# Patient Record
Sex: FEMALE | Race: BLACK OR AFRICAN AMERICAN | Employment: UNEMPLOYED | URBAN - METROPOLITAN AREA
[De-identification: names, ages, dates, MRNs, and addresses within clinical notes are randomized per-mention and may not be internally consistent; named-entity substitution may affect disease eponyms.]

---

## 2018-06-01 ENCOUNTER — HOSPITAL ENCOUNTER (OUTPATIENT)
Dept: LAB | Age: 58
Discharge: HOME | End: 2018-06-01

## 2018-06-01 PROCEDURE — 99001 SPECIMEN HANDLING PT-LAB: CPT | Performed by: INTERNAL MEDICINE

## 2018-10-12 ENCOUNTER — HOSPITAL ENCOUNTER (OUTPATIENT)
Dept: INFUSION THERAPY | Age: 58
Discharge: HOME | End: 2018-10-12
Payer: MEDICARE

## 2018-10-12 VITALS
RESPIRATION RATE: 18 BRPM | HEART RATE: 75 BPM | TEMPERATURE: 97.9 F | DIASTOLIC BLOOD PRESSURE: 75 MMHG | OXYGEN SATURATION: 96 % | SYSTOLIC BLOOD PRESSURE: 135 MMHG

## 2018-10-12 PROCEDURE — 96372 THER/PROPH/DIAG INJ SC/IM: CPT

## 2018-10-12 PROCEDURE — 74011250636 HC RX REV CODE- 250/636

## 2018-10-12 PROCEDURE — C9466 INJECTION, BENRALIZUMAB: HCPCS

## 2018-10-12 RX ADMIN — BENRALIZUMAB 30 MG: 30 INJECTION, SOLUTION SUBCUTANEOUS at 11:45

## 2018-10-12 NOTE — PROGRESS NOTES
[facility] Progress Note Date: 2018 Name: Radha Crawley MRN: [id number] : 1960 Fasenra 1 of 3 q4 weeks Ms. Arun Up arrived to [facility] at 80 accompanied by her daughter. Ms. Arun Up was assessed and education was provided. Ms. Elaine Rodriguez vitals were reviewed. Visit Vitals  /75 (BP 1 Location: Left arm, BP Patient Position: At rest)  Pulse 75  Temp 97.9 °F (36.6 °C)  Resp 18  SpO2 96%  Breastfeeding No  
 
 
Fasenra  30 mg was administered as ordered SQ in patient's Left upper arm. Band-aid applied to site. Reviewed discharge instructions with patient, including expected side effects (abdominal cramping, nausea, changes in color of urine or feces) and signs of allergic reaction requiring medical attention (itching/hives/rashes, SOB, chest pain, lip/tongue/facial swelling). Patient given printed copy to take home. Patient verbalized understanding of discharge instructions. Care notes uploaded in patient chart. Patient agreed to be observed for 10 mins. During that time no adverse reaction was noted. Ms. Arun Up tolerated well without complaints. Ms. Arun Up was discharged from [place] [address] in stable condition at 1205. She is to return on 2018 at 1130 for her next appointment. Tray Farmer RN 2018

## 2018-11-09 ENCOUNTER — HOSPITAL ENCOUNTER (OUTPATIENT)
Dept: INFUSION THERAPY | Age: 58
Discharge: HOME | End: 2018-11-09
Payer: MEDICARE

## 2018-11-14 ENCOUNTER — HOSPITAL ENCOUNTER (OUTPATIENT)
Dept: INFUSION THERAPY | Age: 58
Discharge: HOME | End: 2018-11-14
Payer: MEDICARE

## 2018-11-16 ENCOUNTER — HOSPITAL ENCOUNTER (OUTPATIENT)
Dept: INFUSION THERAPY | Age: 58
Discharge: HOME | End: 2018-11-16
Payer: MEDICARE

## 2018-11-16 VITALS
OXYGEN SATURATION: 97 % | SYSTOLIC BLOOD PRESSURE: 109 MMHG | TEMPERATURE: 98.9 F | DIASTOLIC BLOOD PRESSURE: 72 MMHG | RESPIRATION RATE: 18 BRPM | HEART RATE: 87 BPM

## 2018-11-16 PROCEDURE — C9466 INJECTION, BENRALIZUMAB: HCPCS

## 2018-11-16 PROCEDURE — 74011250636 HC RX REV CODE- 250/636

## 2018-11-16 PROCEDURE — 96372 THER/PROPH/DIAG INJ SC/IM: CPT

## 2018-11-16 RX ADMIN — BENRALIZUMAB 30 MG: 30 INJECTION, SOLUTION SUBCUTANEOUS at 11:06

## 2018-11-16 NOTE — PROGRESS NOTES
[facility] Progress Note Date: 2018 Name: Milagro Chen MRN: [id number] : 1960 Fasenra 2 of 3 q4 weeks Ms. Katt Riley arrived to [facility] at [phone] accompanied by her daughter. Ms. Katt Riley was assessed and education was provided. Ms. Tevin Rodriguez vitals were reviewed. There were no vitals taken for this visit. Fasenra  30 mg was administered as ordered SQ in patient's Left upper arm. Band-aid applied to site. Ms. Katt Riley tolerated well without complaints. Ms. Katt Riley was discharged from [place] [address] in stable condition at 1110. She is to return on 2018 at 1100 for her next appointment. Renetta Soriano 2018

## 2018-12-07 ENCOUNTER — APPOINTMENT (OUTPATIENT)
Dept: INFUSION THERAPY | Age: 58
End: 2018-12-07

## 2018-12-12 ENCOUNTER — APPOINTMENT (OUTPATIENT)
Dept: INFUSION THERAPY | Age: 58
End: 2018-12-12
Payer: MEDICARE

## 2018-12-14 ENCOUNTER — HOSPITAL ENCOUNTER (OUTPATIENT)
Dept: INFUSION THERAPY | Age: 58
Discharge: HOME | End: 2018-12-14
Payer: MEDICARE

## 2018-12-21 ENCOUNTER — HOSPITAL ENCOUNTER (OUTPATIENT)
Dept: INFUSION THERAPY | Age: 58
Discharge: HOME | End: 2018-12-21
Payer: MEDICARE

## 2018-12-21 VITALS
DIASTOLIC BLOOD PRESSURE: 59 MMHG | SYSTOLIC BLOOD PRESSURE: 111 MMHG | OXYGEN SATURATION: 93 % | HEART RATE: 95 BPM | TEMPERATURE: 98.1 F | RESPIRATION RATE: 18 BRPM

## 2018-12-21 DIAGNOSIS — J45.901 EXACERBATION OF ASTHMA, UNSPECIFIED ASTHMA SEVERITY, UNSPECIFIED WHETHER PERSISTENT: ICD-10-CM

## 2018-12-21 PROCEDURE — 96372 THER/PROPH/DIAG INJ SC/IM: CPT

## 2018-12-21 PROCEDURE — C9466 INJECTION, BENRALIZUMAB: HCPCS

## 2018-12-21 PROCEDURE — 74011250636 HC RX REV CODE- 250/636

## 2018-12-21 RX ADMIN — BENRALIZUMAB 30 MG: 30 INJECTION, SOLUTION SUBCUTANEOUS at 14:34

## 2018-12-21 NOTE — PROGRESS NOTES
[facility] Progress Note    Date: 2018    Name: Cisco Alvarado    MRN: [id number]         : 1960    Len Pickard    Ms. Sujatha Delacruz was assessed and education was provided. Ms. Dennise Costa vitals were reviewed and patient was observed for 5 minutes prior to treatment. Visit Vitals  /59 (BP 1 Location: Right arm, BP Patient Position: Sitting)   Pulse 95   Temp 98.1 °F (36.7 °C)   Resp 18   SpO2 93%   Breastfeeding? No       Lab results were obtained and reviewed. No results found for this or any previous visit (from the past 12 hour(s)). Fasenra 30 mg was administered subcutaneous in  Left upper arm. Bans aid placed at site. Ms. Sujatha Delacruz tolerated well, and had no complaints. Patient armband removed and shredded. Ms. Sujatha Delacruz was discharged from [place] [address] in stable condition at 1440. She is to return on 2/15/19 at 1400 for her next appointment.     Shanell Tavarez RN  2018  3:50 PM

## 2019-01-16 ENCOUNTER — APPOINTMENT (OUTPATIENT)
Dept: INFUSION THERAPY | Age: 59
End: 2019-01-16

## 2019-02-01 ENCOUNTER — APPOINTMENT (OUTPATIENT)
Dept: INFUSION THERAPY | Age: 59
End: 2019-02-01
Payer: MEDICARE

## 2019-02-08 ENCOUNTER — APPOINTMENT (OUTPATIENT)
Dept: INFUSION THERAPY | Age: 59
End: 2019-02-08

## 2019-06-07 ENCOUNTER — HOSPITAL ENCOUNTER (OUTPATIENT)
Dept: INFUSION THERAPY | Age: 59
Discharge: HOME | End: 2019-06-07

## 2019-09-24 ENCOUNTER — HOSPITAL ENCOUNTER (OUTPATIENT)
Dept: INFUSION THERAPY | Age: 59
Discharge: HOME | End: 2019-09-24
Payer: MEDICARE

## 2019-09-24 VITALS
OXYGEN SATURATION: 98 % | DIASTOLIC BLOOD PRESSURE: 69 MMHG | TEMPERATURE: 97.9 F | SYSTOLIC BLOOD PRESSURE: 111 MMHG | RESPIRATION RATE: 18 BRPM | HEART RATE: 90 BPM

## 2019-09-24 DIAGNOSIS — J45.901 EXACERBATION OF ASTHMA, UNSPECIFIED ASTHMA SEVERITY, UNSPECIFIED WHETHER PERSISTENT: ICD-10-CM

## 2019-09-24 PROCEDURE — 96372 THER/PROPH/DIAG INJ SC/IM: CPT

## 2019-09-24 PROCEDURE — 74011250636 HC RX REV CODE- 250/636: Performed by: NURSE PRACTITIONER

## 2019-09-24 RX ORDER — GABAPENTIN 600 MG/1
800 TABLET ORAL
COMMUNITY

## 2019-09-24 RX ADMIN — BENRALIZUMAB 30 MG: 30 INJECTION, SOLUTION SUBCUTANEOUS at 09:33

## 2019-09-24 NOTE — PROGRESS NOTES
[facility] Progress Note    Date: 2019    Name: Roxy Barbour    MRN: [id number]         : 1960    Yates Pan 1 of 3    Ms. Bennie Nails was assessed and education was provided. Ms. Maribell Ortiz vitals were reviewed and patient was observed for 5 minutes prior to treatment. Visit Vitals  /69 (BP 1 Location: Left arm, BP Patient Position: Sitting)   Pulse 90   Temp 97.9 °F (36.6 °C)   Resp 18   SpO2 98%   Breastfeeding? No       Lab results were obtained and reviewed. No results found for this or any previous visit (from the past 12 hour(s)). Fasenra 30 mg was administered subcutaneous in  Left upper arm. Bans aid placed at site. Ms. Bennie Nails tolerated well, and had no complaints. Patient armband removed and shredded. Ms. Bennie Nails was discharged from [place] [address] in stable condition at [address] She is to return on 10/22/19 at 0930 for her next appointment.     Dagoberto Causey RN  2019  3:50 PM

## 2019-10-08 ENCOUNTER — APPOINTMENT (OUTPATIENT)
Dept: GENERAL RADIOLOGY | Age: 59
DRG: 190 | End: 2019-10-08
Attending: EMERGENCY MEDICINE
Payer: MEDICARE

## 2019-10-08 ENCOUNTER — HOSPITAL ENCOUNTER (INPATIENT)
Age: 59
LOS: 7 days | Discharge: HOME | DRG: 190 | End: 2019-10-17
Attending: EMERGENCY MEDICINE | Admitting: HOSPITALIST
Payer: MEDICARE

## 2019-10-08 DIAGNOSIS — J40 BRONCHITIS: ICD-10-CM

## 2019-10-08 DIAGNOSIS — J44.1 COPD EXACERBATION (HCC): ICD-10-CM

## 2019-10-08 DIAGNOSIS — J45.51 SEVERE PERSISTENT ASTHMA WITH ACUTE EXACERBATION: Primary | ICD-10-CM

## 2019-10-08 PROBLEM — J96.01 ACUTE RESPIRATORY FAILURE WITH HYPOXEMIA (HCC): Status: ACTIVE | Noted: 2019-10-08

## 2019-10-08 PROBLEM — E11.9 DM (DIABETES MELLITUS) (HCC): Status: ACTIVE | Noted: 2019-10-08

## 2019-10-08 LAB
ALBUMIN SERPL-MCNC: 3.4 G/DL (ref 3.4–5)
ALBUMIN/GLOB SERPL: 0.9 {RATIO} (ref 0.8–1.7)
ALP SERPL-CCNC: 102 U/L (ref 45–117)
ALT SERPL-CCNC: 20 U/L (ref 13–56)
ANION GAP SERPL CALC-SCNC: 5 MMOL/L (ref 3–18)
ARTERIAL PATENCY WRIST A: YES
AST SERPL-CCNC: 31 U/L (ref 10–38)
BASE DEFICIT BLD-SCNC: 3 MMOL/L
BASOPHILS # BLD: 0 K/UL (ref 0–0.1)
BASOPHILS NFR BLD: 0 % (ref 0–2)
BDY SITE: NORMAL
BILIRUB SERPL-MCNC: 0.4 MG/DL (ref 0.2–1)
BODY TEMPERATURE: 98.8
BUN SERPL-MCNC: 8 MG/DL (ref 7–18)
BUN/CREAT SERPL: 11 (ref 12–20)
CALCIUM SERPL-MCNC: 8.4 MG/DL (ref 8.5–10.1)
CHLORIDE SERPL-SCNC: 107 MMOL/L (ref 100–111)
CO2 SERPL-SCNC: 27 MMOL/L (ref 21–32)
CREAT SERPL-MCNC: 0.72 MG/DL (ref 0.6–1.3)
DIFFERENTIAL METHOD BLD: ABNORMAL
EOSINOPHIL # BLD: 0 K/UL (ref 0–0.4)
EOSINOPHIL NFR BLD: 0 % (ref 0–5)
ERYTHROCYTE [DISTWIDTH] IN BLOOD BY AUTOMATED COUNT: 18 % (ref 11.6–14.5)
EST. AVERAGE GLUCOSE BLD GHB EST-MCNC: 166 MG/DL
GAS FLOW.O2 O2 DELIVERY SYS: NORMAL L/MIN
GAS FLOW.O2 SETTING OXYMISER: 2 L/M
GLOBULIN SER CALC-MCNC: 3.7 G/DL (ref 2–4)
GLUCOSE BLD STRIP.AUTO-MCNC: 373 MG/DL (ref 70–110)
GLUCOSE SERPL-MCNC: 162 MG/DL (ref 74–99)
HBA1C MFR BLD: 7.4 % (ref 4.2–5.6)
HCO3 BLD-SCNC: 22.3 MMOL/L (ref 22–26)
HCT VFR BLD AUTO: 31.2 % (ref 35–45)
HGB BLD-MCNC: 9.2 G/DL (ref 12–16)
LYMPHOCYTES # BLD: 3.4 K/UL (ref 0.9–3.6)
LYMPHOCYTES NFR BLD: 38 % (ref 21–52)
MCH RBC QN AUTO: 21.5 PG (ref 24–34)
MCHC RBC AUTO-ENTMCNC: 29.5 G/DL (ref 31–37)
MCV RBC AUTO: 73.1 FL (ref 74–97)
MONOCYTES # BLD: 0.4 K/UL (ref 0.05–1.2)
MONOCYTES NFR BLD: 4 % (ref 3–10)
NEUTS SEG # BLD: 5.3 K/UL (ref 1.8–8)
NEUTS SEG NFR BLD: 58 % (ref 40–73)
O2/TOTAL GAS SETTING VFR VENT: 0.28 %
PCO2 BLD: 38.6 MMHG (ref 35–45)
PH BLD: 7.37 [PH] (ref 7.35–7.45)
PLATELET # BLD AUTO: 359 K/UL (ref 135–420)
PMV BLD AUTO: 9.2 FL (ref 9.2–11.8)
PO2 BLD: 82 MMHG (ref 80–100)
POTASSIUM SERPL-SCNC: 4.1 MMOL/L (ref 3.5–5.5)
PROT SERPL-MCNC: 7.1 G/DL (ref 6.4–8.2)
RBC # BLD AUTO: 4.27 M/UL (ref 4.2–5.3)
SAO2 % BLD: 96 % (ref 92–97)
SERVICE CMNT-IMP: NORMAL
SODIUM SERPL-SCNC: 139 MMOL/L (ref 136–145)
SPECIMEN TYPE: NORMAL
TOTAL RESP. RATE, ITRR: 20
TROPONIN I SERPL-MCNC: <0.02 NG/ML (ref 0–0.04)
WBC # BLD AUTO: 9.1 K/UL (ref 4.6–13.2)

## 2019-10-08 PROCEDURE — 36600 WITHDRAWAL OF ARTERIAL BLOOD: CPT

## 2019-10-08 PROCEDURE — 85025 COMPLETE CBC W/AUTO DIFF WBC: CPT

## 2019-10-08 PROCEDURE — 83036 HEMOGLOBIN GLYCOSYLATED A1C: CPT

## 2019-10-08 PROCEDURE — 94640 AIRWAY INHALATION TREATMENT: CPT

## 2019-10-08 PROCEDURE — 96365 THER/PROPH/DIAG IV INF INIT: CPT

## 2019-10-08 PROCEDURE — 74011250637 HC RX REV CODE- 250/637: Performed by: HOSPITALIST

## 2019-10-08 PROCEDURE — 94762 N-INVAS EAR/PLS OXIMTRY CONT: CPT

## 2019-10-08 PROCEDURE — 74011000250 HC RX REV CODE- 250: Performed by: HOSPITALIST

## 2019-10-08 PROCEDURE — 74011000250 HC RX REV CODE- 250: Performed by: EMERGENCY MEDICINE

## 2019-10-08 PROCEDURE — 80053 COMPREHEN METABOLIC PANEL: CPT

## 2019-10-08 PROCEDURE — 93005 ELECTROCARDIOGRAM TRACING: CPT

## 2019-10-08 PROCEDURE — 94761 N-INVAS EAR/PLS OXIMETRY MLT: CPT

## 2019-10-08 PROCEDURE — 74011250636 HC RX REV CODE- 250/636: Performed by: HOSPITALIST

## 2019-10-08 PROCEDURE — 99218 HC RM OBSERVATION: CPT

## 2019-10-08 PROCEDURE — 96376 TX/PRO/DX INJ SAME DRUG ADON: CPT

## 2019-10-08 PROCEDURE — 82962 GLUCOSE BLOOD TEST: CPT

## 2019-10-08 PROCEDURE — 71046 X-RAY EXAM CHEST 2 VIEWS: CPT

## 2019-10-08 PROCEDURE — 74011636637 HC RX REV CODE- 636/637: Performed by: HOSPITALIST

## 2019-10-08 PROCEDURE — 82803 BLOOD GASES ANY COMBINATION: CPT

## 2019-10-08 PROCEDURE — 96372 THER/PROPH/DIAG INJ SC/IM: CPT

## 2019-10-08 PROCEDURE — 84484 ASSAY OF TROPONIN QUANT: CPT

## 2019-10-08 PROCEDURE — 96375 TX/PRO/DX INJ NEW DRUG ADDON: CPT

## 2019-10-08 PROCEDURE — 99285 EMERGENCY DEPT VISIT HI MDM: CPT

## 2019-10-08 PROCEDURE — 74011250636 HC RX REV CODE- 250/636: Performed by: EMERGENCY MEDICINE

## 2019-10-08 RX ORDER — ACETAMINOPHEN 325 MG/1
650 TABLET ORAL
Status: DISCONTINUED | OUTPATIENT
Start: 2019-10-08 | End: 2019-10-17 | Stop reason: HOSPADM

## 2019-10-08 RX ORDER — MONTELUKAST SODIUM 10 MG/1
10 TABLET ORAL
Status: DISCONTINUED | OUTPATIENT
Start: 2019-10-08 | End: 2019-10-17 | Stop reason: HOSPADM

## 2019-10-08 RX ORDER — HEPARIN SODIUM 5000 [USP'U]/ML
5000 INJECTION, SOLUTION INTRAVENOUS; SUBCUTANEOUS EVERY 8 HOURS
Status: DISCONTINUED | OUTPATIENT
Start: 2019-10-08 | End: 2019-10-17 | Stop reason: HOSPADM

## 2019-10-08 RX ORDER — ALBUTEROL SULFATE 0.83 MG/ML
10 SOLUTION RESPIRATORY (INHALATION)
Status: COMPLETED | OUTPATIENT
Start: 2019-10-08 | End: 2019-10-08

## 2019-10-08 RX ORDER — IPRATROPIUM BROMIDE AND ALBUTEROL SULFATE 2.5; .5 MG/3ML; MG/3ML
3 SOLUTION RESPIRATORY (INHALATION)
Status: DISCONTINUED | OUTPATIENT
Start: 2019-10-08 | End: 2019-10-11

## 2019-10-08 RX ORDER — GABAPENTIN 300 MG/1
600 CAPSULE ORAL 3 TIMES DAILY
Status: DISCONTINUED | OUTPATIENT
Start: 2019-10-08 | End: 2019-10-17 | Stop reason: HOSPADM

## 2019-10-08 RX ORDER — GUAIFENESIN 100 MG/5ML
100 SOLUTION ORAL
Status: DISCONTINUED | OUTPATIENT
Start: 2019-10-08 | End: 2019-10-09

## 2019-10-08 RX ORDER — MAGNESIUM SULFATE HEPTAHYDRATE 40 MG/ML
2 INJECTION, SOLUTION INTRAVENOUS
Status: COMPLETED | OUTPATIENT
Start: 2019-10-08 | End: 2019-10-08

## 2019-10-08 RX ORDER — MAGNESIUM SULFATE HEPTAHYDRATE 500 MG/ML
2 INJECTION, SOLUTION INTRAMUSCULAR; INTRAVENOUS
Status: DISCONTINUED | OUTPATIENT
Start: 2019-10-08 | End: 2019-10-08 | Stop reason: SDUPTHER

## 2019-10-08 RX ORDER — OXYCODONE AND ACETAMINOPHEN 10; 325 MG/1; MG/1
1 TABLET ORAL EVERY 12 HOURS
Status: DISCONTINUED | OUTPATIENT
Start: 2019-10-08 | End: 2019-10-09

## 2019-10-08 RX ORDER — IPRATROPIUM BROMIDE AND ALBUTEROL SULFATE 2.5; .5 MG/3ML; MG/3ML
3 SOLUTION RESPIRATORY (INHALATION) ONCE
Status: COMPLETED | OUTPATIENT
Start: 2019-10-08 | End: 2019-10-08

## 2019-10-08 RX ORDER — QUETIAPINE FUMARATE 300 MG/1
300 TABLET, FILM COATED ORAL
Status: DISCONTINUED | OUTPATIENT
Start: 2019-10-08 | End: 2019-10-17 | Stop reason: HOSPADM

## 2019-10-08 RX ORDER — SERTRALINE HYDROCHLORIDE 50 MG/1
150 TABLET, FILM COATED ORAL
Status: DISCONTINUED | OUTPATIENT
Start: 2019-10-08 | End: 2019-10-17 | Stop reason: HOSPADM

## 2019-10-08 RX ORDER — PANTOPRAZOLE SODIUM 40 MG/1
40 TABLET, DELAYED RELEASE ORAL DAILY
Status: DISCONTINUED | OUTPATIENT
Start: 2019-10-09 | End: 2019-10-11

## 2019-10-08 RX ORDER — AMLODIPINE BESYLATE 5 MG/1
10 TABLET ORAL DAILY
Status: DISCONTINUED | OUTPATIENT
Start: 2019-10-09 | End: 2019-10-17 | Stop reason: HOSPADM

## 2019-10-08 RX ORDER — MAGNESIUM SULFATE 100 %
4 CRYSTALS MISCELLANEOUS AS NEEDED
Status: DISCONTINUED | OUTPATIENT
Start: 2019-10-08 | End: 2019-10-17 | Stop reason: HOSPADM

## 2019-10-08 RX ORDER — DOXYCYCLINE 100 MG/1
100 CAPSULE ORAL EVERY 12 HOURS
Status: DISCONTINUED | OUTPATIENT
Start: 2019-10-08 | End: 2019-10-11

## 2019-10-08 RX ORDER — ALBUTEROL SULFATE 0.83 MG/ML
5 SOLUTION RESPIRATORY (INHALATION)
Status: COMPLETED | OUTPATIENT
Start: 2019-10-08 | End: 2019-10-08

## 2019-10-08 RX ORDER — SERTRALINE HYDROCHLORIDE 50 MG/1
100 TABLET, FILM COATED ORAL DAILY
Status: DISCONTINUED | OUTPATIENT
Start: 2019-10-09 | End: 2019-10-17 | Stop reason: HOSPADM

## 2019-10-08 RX ORDER — INSULIN LISPRO 100 [IU]/ML
INJECTION, SOLUTION INTRAVENOUS; SUBCUTANEOUS
Status: DISCONTINUED | OUTPATIENT
Start: 2019-10-08 | End: 2019-10-09

## 2019-10-08 RX ORDER — KETOROLAC TROMETHAMINE 30 MG/ML
60 INJECTION, SOLUTION INTRAMUSCULAR; INTRAVENOUS
Status: DISCONTINUED | OUTPATIENT
Start: 2019-10-08 | End: 2019-10-08

## 2019-10-08 RX ORDER — KETOROLAC TROMETHAMINE 30 MG/ML
30 INJECTION, SOLUTION INTRAMUSCULAR; INTRAVENOUS
Status: COMPLETED | OUTPATIENT
Start: 2019-10-08 | End: 2019-10-08

## 2019-10-08 RX ORDER — CLONAZEPAM 0.5 MG/1
1 TABLET ORAL
Status: DISCONTINUED | OUTPATIENT
Start: 2019-10-08 | End: 2019-10-17 | Stop reason: HOSPADM

## 2019-10-08 RX ADMIN — ALBUTEROL SULFATE 5 MG: 2.5 SOLUTION RESPIRATORY (INHALATION) at 16:48

## 2019-10-08 RX ADMIN — IPRATROPIUM BROMIDE AND ALBUTEROL SULFATE 3 ML: .5; 3 SOLUTION RESPIRATORY (INHALATION) at 21:35

## 2019-10-08 RX ADMIN — SODIUM CHLORIDE 1000 ML: 900 INJECTION, SOLUTION INTRAVENOUS at 15:24

## 2019-10-08 RX ADMIN — GABAPENTIN 600 MG: 300 CAPSULE ORAL at 22:13

## 2019-10-08 RX ADMIN — ALBUTEROL SULFATE 10 MG: 2.5 SOLUTION RESPIRATORY (INHALATION) at 15:24

## 2019-10-08 RX ADMIN — MAGNESIUM SULFATE IN WATER 2 G: 40 INJECTION, SOLUTION INTRAVENOUS at 15:24

## 2019-10-08 RX ADMIN — DOXYCYCLINE 100 MG: 100 CAPSULE ORAL at 22:14

## 2019-10-08 RX ADMIN — KETOROLAC TROMETHAMINE 30 MG: 30 INJECTION, SOLUTION INTRAMUSCULAR; INTRAVENOUS at 16:32

## 2019-10-08 RX ADMIN — IPRATROPIUM BROMIDE AND ALBUTEROL SULFATE 3 ML: .5; 3 SOLUTION RESPIRATORY (INHALATION) at 15:24

## 2019-10-08 RX ADMIN — MONTELUKAST 10 MG: 10 TABLET, FILM COATED ORAL at 22:13

## 2019-10-08 RX ADMIN — HEPARIN SODIUM 5000 UNITS: 5000 INJECTION INTRAVENOUS; SUBCUTANEOUS at 22:14

## 2019-10-08 RX ADMIN — METHYLPREDNISOLONE SODIUM SUCCINATE 125 MG: 125 INJECTION, POWDER, FOR SOLUTION INTRAMUSCULAR; INTRAVENOUS at 15:24

## 2019-10-08 RX ADMIN — QUETIAPINE FUMARATE 300 MG: 300 TABLET ORAL at 22:13

## 2019-10-08 RX ADMIN — INSULIN LISPRO 10 UNITS: 100 INJECTION, SOLUTION INTRAVENOUS; SUBCUTANEOUS at 22:15

## 2019-10-08 RX ADMIN — GUAIFENESIN 100 MG: 200 SOLUTION ORAL at 22:19

## 2019-10-08 RX ADMIN — SERTRALINE HYDROCHLORIDE 150 MG: 50 TABLET ORAL at 23:26

## 2019-10-08 RX ADMIN — METHYLPREDNISOLONE SODIUM SUCCINATE 60 MG: 125 INJECTION, POWDER, FOR SOLUTION INTRAMUSCULAR; INTRAVENOUS at 23:27

## 2019-10-08 RX ADMIN — OXYCODONE HYDROCHLORIDE AND ACETAMINOPHEN 1 TABLET: 10; 325 TABLET ORAL at 22:15

## 2019-10-08 NOTE — H&P
Internal Medicine History and Physical          Subjective     HPI: Taurus Aj is a 64 y.o. female with a noted PMHx of who presented to the ED with worsening, constant fevers, fatigue, productive cough, and dyspnea for the past 14 days. She was seen by her outpatient pulmonologist Dr. Clara Lanier who sees her for COPD. He prescribed levaquin x 14 days, breathing treatments, muccinex, and steroids without relief. Dr. Clara Lanier sent patient to the ED for further eval.  CXR was unremarkable for PNA or any other acute cardiopulm abnorm. She was given multiple breathing treatments, steroids, IV mag, fluids. ABG unremarkable.   She is requiring oxygen which she does not wear as an outpatient    PMHx:  Past Medical History:   Diagnosis Date    Arthritis     Asthma     Chronic pain     Bilateral knees    COPD     Diabetes mellitus ([facility]     GERD (gastroesophageal reflux disease)     HTN (hypertension)     Morbid obesity ([facility]     Osteoarthritis of right knee 2016    Psychiatric disorder     Major depression, PTSD, and anxiety       PSurgHx:  Past Surgical History:   Procedure Laterality Date    HX  SECTION      HX OTHER SURGICAL      Facial reconstruction       SocialHx:  Social History     Socioeconomic History    Marital status:      Spouse name: Not on file    Number of children: Not on file    Years of education: Not on file    Highest education level: Not on file   Occupational History    Not on file   Social Needs    Financial resource strain: Not on file    Food insecurity:     Worry: Not on file     Inability: Not on file    Transportation needs:     Medical: Not on file     Non-medical: Not on file   Tobacco Use    Smoking status: Former Smoker     Packs/day: 0.50     Years: 35.00     Pack years: 17.50     Last attempt to quit: 2019     Years since quittin.0    Smokeless tobacco: Never Used   Substance and Sexual Activity    Alcohol use: No    Drug use: No    Sexual activity: Never   Lifestyle    Physical activity:     Days per week: Not on file     Minutes per session: Not on file    Stress: Not on file   Relationships    Social connections:     Talks on phone: Not on file     Gets together: Not on file     Attends Islam service: Not on file     Active member of club or organization: Not on file     Attends meetings of clubs or organizations: Not on file     Relationship status: Not on file    Intimate partner violence:     Fear of current or ex partner: Not on file     Emotionally abused: Not on file     Physically abused: Not on file     Forced sexual activity: Not on file   Other Topics Concern    Not on file   Social History Narrative    Not on file       Allergies: Allergies   Allergen Reactions    Latex Hives and Itching    Pollens Extract Sneezing    Demerol [Meperidine] Hives and Itching    Penicillins Rash    Shellfish Derived Itching    Soap Itching     Jergen's, Ivory    Tessalon [Benzonatate] Swelling       FamilyHx:  History reviewed. No pertinent family history. Prior to Admission Medications   Prescriptions Last Dose Informant Patient Reported? Taking? CALCIUM CITRATE/VITAMIN D3 (CITRACAL + D PO)   Yes No   Sig: Take 1 Tab by mouth daily. QUEtiapine (SEROQUEL) 300 mg tablet   Yes No   Sig: Take 300 mg by mouth nightly. albuterol (PROVENTIL HFA, VENTOLIN HFA, PROAIR HFA) 90 mcg/actuation inhaler   Yes No   Sig: Take 2 Puffs by inhalation every four (4) hours as needed for Wheezing. amLODIPine (NORVASC) 10 mg tablet   Yes No   Sig: Take  by mouth daily. clonazePAM (KLONOPIN) 1 mg tablet   Yes No   Sig: Take 1 mg by mouth three (3) times daily as needed. gabapentin (NEURONTIN) 600 mg tablet   Yes No   Sig: Take  by mouth three (3) times daily. glimepiride (AMARYL) 2 mg tablet   Yes No   Sig: Take 2 mg by mouth daily (with breakfast). loratadine (CLARITIN) 10 mg tablet   Yes No   Sig: Take 10 mg by mouth daily. meloxicam (MOBIC) 7.5 mg tablet   No No   Sig: Take 1 Tab by mouth two (2) times a day. montelukast (SINGULAIR) 10 mg tablet   Yes No   Sig: Take 10 mg by mouth nightly. ondansetron (ZOFRAN ODT) 8 mg disintegrating tablet   No No   Sig: Take 1 Tab by mouth every eight (8) hours as needed for Nausea. oxyCODONE IR (ROXICODONE) 20 mg immediate release tablet   Yes No   Sig: Take 20 mg by mouth every four (4) hours as needed. oxyCODONE IR (ROXICODONE) 20 mg immediate release tablet   No No   Sig: Take 1 Tab by mouth every four (4) hours as needed. Max Daily Amount: 120 mg.   pantoprazole (PROTONIX) 40 mg tablet   Yes No   Sig: Take 40 mg by mouth daily. sertraline (ZOLOFT) 100 mg tablet   Yes No   Sig: Take 200 mg by mouth nightly. triamcinolone acetonide (KENALOG) 0.1 % topical cream   Yes No   Sig: Apply  to affected area two (2) times a day.  use thin layer   Indications: CONTACT DERMATITIS      Facility-Administered Medications: None       Review of Systems:  CONST: Fever, weight loss, fatigue or chills, insomnia   HEENT: Recent changes in vision, vertigo, epistaxis, dysphagia and hoarseness  CV: Chest pain, palpitations, edema and varicosities  RESP: Cough, shortness of breath, wheezing, hemoptysis, snoring and reactive airway disease  GI: Nausea, vomiting, abdominal pain, change in bowel habits, hematochezia, melena, and GERD   : Hematuria, dysuria, frequency, urgency, nocturia and stress urinary incontinence   MS: Weakness, joint pain and arthritis  ENDO: Polyuria, polydipsia, polyphagia, poor wound healing, heat intolerance, cold intolerance  LYMPH/HEME: Anemia, bruising and history of blood transfusions  INTEG: Dermatitis, abnormal moles  NEURO: Dizziness, headache, fainting, seizures and stroke  PSYCH: Anxiety and depression    A comprehensive review of systems was negative except for that written in the History of Present Illness.     Objective      Visit Vitals  /81   Pulse 89   Temp 98.8 °F (37.1 °C)   Resp 22   Ht 5' 7\" (1.702 m)   Wt 113.4 kg (250 lb)   SpO2 95%   BMI 39.16 kg/m²       Physical Exam:  General Appearance: Mild resp distress, conversational dyspnea, NC in place, obese   HENT: normocephalic/atraumatic, moist mucus membranes  Lungs: Audile wheezing, diffuse expiratory wheeze bilat  Cardiovascular: RRR, no m/r/g, capillary refill < 2 sec, B/L DP/PT pulses +3/4  Abdomen: soft, non distended, non-tender, active bowel sounds, no guarding or rigidity   Extremities: no cyanosis, no peripheral edema, good perfusion bilaterally   Neuro: moves all extremities, no focal deficits, nml tone  Psych: appropriate affect, alert and oriented to person, place and time    Laboratory Studies: All lab results for the last 24 hours reviewed. Imaging Reviewed:  Xr Chest Pa Lat    Result Date: 10/8/2019  EXAM: Two-view chest CLINICAL HISTORY: Short of breath , COMPARISON: None FINDINGS: Frontal and lateral views of the chest demonstrate coarse interstitial markings likely related to some areas of minor scarring. . Cardiac silhouette is normal in size and contour. No acute bony or soft tissue abnormality. IMPRESSION: No significant interval change or acute pulmonary process identified.        EKG:   Normal sinus rhythm   Early repolarization   Normal ECG   When compared with ECG of 13-JUL-2011 08:42,   No significant change was found   Confirmed by John Davis MD ([id number]) on 5/12/2016 7:56:38 PM     Assessment/Plan     Active Hospital Problems    Diagnosis Date Noted    COPD exacerbation ([facility] 10/08/2019    DM (diabetes mellitus) ([facility] 10/08/2019    Acute respiratory failure with hypoxemia ([facility] 10/08/2019    Obesity 11/15/2012       Acute Hypoxemic Resp Failure  -oxygen as needed  -Secondary to known hx of Asthma/COPD most likely, CXR clear for PNA  -low pretest probability for PE    COPD Exacerbation  Supplemental oxygen, wean as tolerated   Steroids, breathing treatments  Monitor for worsening resp function  Steroids   Follows with Dr. Ida STARKS  -[facility]      - Cont acceptable home medications for chronic conditions   - DVT protocol    I have personally reviewed all pertinent labs, films and EKGs that have officially resulted. I reviewed available electronic documentation outlining the initial presentation as well as the emergency room physician's encounter.     Marcella Nicole PA-C  Internal Medicine, Hospitalist  Pager: [phone] [facility]

## 2019-10-08 NOTE — ED TRIAGE NOTES
Pt sent from PCP with c/o cough and possible \"pneumonia\". Pt with 2 weeks. On levaquin without improvement. SOB with talking. Home O2 use 2lnc. Pt did not come in with O2, states uses mostly at bedtime. States nausea and diarrhea x 3 days. Denies urinary sx's.

## 2019-10-08 NOTE — ED NOTES
Patient is alert, oriented, and stable at this time. No acute distress noted. No longer has audible wheezing noted. Remains on 2L via nasal cannula with stable vitals. Awaiting bed assignment in [facility] area. Patient has all belongings and has informed son that in patient treatment has been ordered. Hospitalists performed bedside interview and assessment. Needs met. Hourly rounds performed.

## 2019-10-08 NOTE — ED PROVIDER NOTES
EMERGENCY DEPARTMENT HISTORY AND PHYSICAL EXAM    Date: 10/8/2019  Patient Name: Cameron Solares    History of Presenting Illness     Chief Complaint   Patient presents with    Cough    Lethargy         History Provided By: Patient    Additional History (Context): Cameron Solares is a 64 y.o. female with diabetes, hypertension, obesity, osteoarthritis, COPD and asthma who presents with worsening productive cough, wheezing. Seen by her pulmonologist today and sent over; has failed 14d of Levaquin. Still having chills, productive yellow green sputum. On 2L NC at home. Quit smoking 2years ago. Denies vomiting; feels nauseated. PCP: Joya Vann NP    Current Outpatient Medications   Medication Sig Dispense Refill    gabapentin (NEURONTIN) 600 mg tablet Take  by mouth three (3) times daily.  oxyCODONE IR (ROXICODONE) 20 mg immediate release tablet Take 1 Tab by mouth every four (4) hours as needed. Max Daily Amount: 120 mg. 60 Tab 0    meloxicam (MOBIC) 7.5 mg tablet Take 1 Tab by mouth two (2) times a day. 14 Tab 0    triamcinolone acetonide (KENALOG) 0.1 % topical cream Apply  to affected area two (2) times a day. use thin layer   Indications: CONTACT DERMATITIS      oxyCODONE IR (ROXICODONE) 20 mg immediate release tablet Take 20 mg by mouth every four (4) hours as needed.  loratadine (CLARITIN) 10 mg tablet Take 10 mg by mouth daily.  pantoprazole (PROTONIX) 40 mg tablet Take 40 mg by mouth daily.  QUEtiapine (SEROQUEL) 300 mg tablet Take 300 mg by mouth nightly.  sertraline (ZOLOFT) 100 mg tablet Take 200 mg by mouth nightly.  clonazePAM (KLONOPIN) 1 mg tablet Take 1 mg by mouth three (3) times daily as needed.  glimepiride (AMARYL) 2 mg tablet Take 2 mg by mouth daily (with breakfast).  albuterol (PROVENTIL HFA, VENTOLIN HFA, PROAIR HFA) 90 mcg/actuation inhaler Take 2 Puffs by inhalation every four (4) hours as needed for Wheezing.       CALCIUM CITRATE/VITAMIN D3 (CITRACAL + D PO) Take 1 Tab by mouth daily.  montelukast (SINGULAIR) 10 mg tablet Take 10 mg by mouth nightly.  ondansetron (ZOFRAN ODT) 8 mg disintegrating tablet Take 1 Tab by mouth every eight (8) hours as needed for Nausea. 12 Tab none    amLODIPine (NORVASC) 10 mg tablet Take  by mouth daily. Past History     Past Medical History:  Past Medical History:   Diagnosis Date    Arthritis     Asthma     Chronic pain     Bilateral knees    COPD     [facility] mellitus ([facility]     GERD (gastroesophageal reflux disease)     HTN (hypertension)     Morbid obesity ([facility]     Osteoarthritis of right knee 2016    Psychiatric disorder     Major depression, PTSD, and anxiety       Past Surgical History:  Past Surgical History:   Procedure Laterality Date    HX  SECTION      HX OTHER SURGICAL      Facial reconstruction       Family History:  History reviewed. No pertinent family history. Social History:  Social History     Tobacco Use    Smoking status: Former Smoker     Packs/day: 0.50     Years: 35.00     Pack years: 17.50     Last attempt to quit: 2019     Years since quittin.0    Smokeless tobacco: Never Used   Substance Use Topics    Alcohol use: No    Drug use: No       Allergies: Allergies   Allergen Reactions    Latex Hives and Itching    Pollens Extract Sneezing    Demerol [Meperidine] Hives and Itching    Penicillins Rash    Shellfish Derived Itching    [facility]     [facility]    Tessalon [Benzonatate] Swelling         Review of Systems   Review of Systems   Constitutional: Positive for chills, fatigue and fever. HENT: Positive for congestion and rhinorrhea. Eyes: Negative. Respiratory: Positive for cough, shortness of breath and wheezing. Gastrointestinal: Positive for nausea. Negative for abdominal pain and vomiting. Endocrine: Negative. Genitourinary: Negative. Musculoskeletal: Negative.     Skin: Negative. Allergic/Immunologic: Negative. Neurological: Negative. Hematological: Negative. Psychiatric/Behavioral: Negative. All Other Systems Negative  Physical Exam     Vitals:    10/08/19 1459 10/08/19 1524 10/08/19 1648   BP: 148/81 145/79 147/81   Pulse: 85 81 89   Resp: 22     Temp: 98.8 °F (37.1 °C)     SpO2: 95%     Weight: 113.4 kg (250 lb)     Height: 5' 7\" (1.702 m)       Physical Exam   Constitutional: She is oriented to person, place, and time. She appears well-developed and well-nourished. She appears distressed. HENT:   Head: Normocephalic and atraumatic. Eyes: Pupils are equal, round, and reactive to light. Neck: No JVD present. No tracheal deviation present. No thyromegaly present. Cardiovascular: Normal rate, regular rhythm and normal heart sounds. Exam reveals no gallop and no friction rub. No murmur heard. Pulmonary/Chest: No stridor. No respiratory distress. She has wheezes. She has no rales. She exhibits no tenderness. Increased WOB, wheezing, tightness   Abdominal: Soft. She exhibits no distension and no mass. There is no tenderness. There is no rebound and no guarding. Musculoskeletal: She exhibits no edema or tenderness. Lymphadenopathy:     She has no cervical adenopathy. Neurological: She is alert and oriented to person, place, and time. Skin: Skin is warm and dry. No rash noted. No erythema. No pallor. Psychiatric: She has a normal mood and affect. Her behavior is normal. Thought content normal.   Nursing note and vitals reviewed.              Diagnostic Study Results     Labs -     Recent Results (from the past 12 hour(s))   METABOLIC PANEL, COMPREHENSIVE    Collection Time: 10/08/19  3:44 PM   Result Value Ref Range    Sodium 139 136 - 145 mmol/L    Potassium 4.1 3.5 - 5.5 mmol/L    Chloride 107 100 - 111 mmol/L    CO2 27 21 - 32 mmol/L    Anion gap 5 3.0 - 18 mmol/L    Glucose 162 (H) 74 - 99 mg/dL    BUN 8 7.0 - 18 MG/DL    Creatinine 0.72 0.6 - 1.3 MG/DL    BUN/Creatinine ratio 11 (L) 12 - 20      GFR est AA >60 >60 ml/min/1.73m2    GFR est non-AA >60 >60 ml/min/1.73m2    Calcium 8.4 (L) 8.5 - 10.1 MG/DL    Bilirubin, total 0.4 0.2 - 1.0 MG/DL    ALT (SGPT) 20 13 - 56 U/L    AST (SGOT) 31 10 - 38 U/L    Alk. phosphatase 102 45 - 117 U/L    Protein, total 7.1 6.4 - 8.2 g/dL    Albumin 3.4 3.4 - 5.0 g/dL    Globulin 3.7 2.0 - 4.0 g/dL    A-G Ratio 0.9 0.8 - 1.7     CBC WITH AUTOMATED DIFF    Collection Time: 10/08/19  3:44 PM   Result Value Ref Range    WBC 9.1 4.6 - 13.2 K/uL    RBC 4.27 4.20 - 5.30 M/uL    HGB 9.2 (L) 12.0 - 16.0 g/dL    HCT 31.2 (L) 35.0 - 45.0 %    MCV 73.1 (L) 74.0 - 97.0 FL    MCH 21.5 (L) 24.0 - 34.0 PG    MCHC 29.5 (L) 31.0 - 37.0 g/dL    RDW 18.0 (H) 11.6 - 14.5 %    PLATELET 412 476 - 389 K/uL    MPV 9.2 9.2 - 11.8 FL    NEUTROPHILS 58 40 - 73 %    LYMPHOCYTES 38 21 - 52 %    MONOCYTES 4 3 - 10 %    EOSINOPHILS 0 0 - 5 %    BASOPHILS 0 0 - 2 %    ABS. NEUTROPHILS 5.3 1.8 - 8.0 K/UL    ABS. LYMPHOCYTES 3.4 0.9 - 3.6 K/UL    ABS. MONOCYTES 0.4 0.05 - 1.2 K/UL    ABS. EOSINOPHILS 0.0 0.0 - 0.4 K/UL    ABS. BASOPHILS 0.0 0.0 - 0.1 K/UL    DF AUTOMATED     TROPONIN I    Collection Time: 10/08/19  3:44 PM   Result Value Ref Range    Troponin-I, QT <0.02 0.0 - 0.045 NG/ML   POC G3    Collection Time: 10/08/19  4:49 PM   Result Value Ref Range    Device: NASAL CANNULA      Flow rate (POC) 2 L/M    FIO2 (POC) 0.28 %    pH (POC) 7.369 7.35 - 7.45      pCO2 (POC) 38.6 35.0 - 45.0 MMHG    pO2 (POC) 82 80 - 100 MMHG    HCO3 (POC) 22.3 22 - 26 MMOL/L    sO2 (POC) 96 92 - 97 %    Base deficit (POC) 3 mmol/L    Allens test (POC) YES      Total resp. rate 20      Site RIGHT RADIAL      Patient temp. 98.8      Specimen type (POC) ARTERIAL      Performed by [facility]        Radiologic Studies -   XR CHEST PA LAT   Final Result   IMPRESSION:      No significant interval change or acute pulmonary process identified.          CT Results  (Last 48 hours)    None        CXR Results  (Last 48 hours)               10/08/19 1528  XR CHEST PA LAT Final result    Impression:  IMPRESSION:       No significant interval change or acute pulmonary process identified. Narrative:  EXAM: Two-view chest       CLINICAL HISTORY: Short of breath ,       COMPARISON: None       FINDINGS:       Frontal and lateral views of the chest demonstrate coarse interstitial markings   likely related to some areas of minor scarring. . Cardiac silhouette is normal in   size and contour. No acute bony or soft tissue abnormality. Medical Decision Making   I am the first provider for this patient. I reviewed the vital signs, available nursing notes, past medical history, past surgical history, family history and social history. Vital Signs-Reviewed the patient's vital signs. Procedures:  Procedures    Provider Notes (Medical Decision Making): pt still not clear after numerous nebs, steroid, anti-tussive, magnesium, IVF. No PNA on CXR. Hospitalist reviewed chart, examined pt and agree she needs admission for her severe asthma condition. MED RECONCILIATION:  No current facility-administered medications for this encounter. Current Outpatient Medications   Medication Sig    gabapentin (NEURONTIN) 600 mg tablet Take  by mouth three (3) times daily.  oxyCODONE IR (ROXICODONE) 20 mg immediate release tablet Take 1 Tab by mouth every four (4) hours as needed. Max Daily Amount: 120 mg.    meloxicam (MOBIC) 7.5 mg tablet Take 1 Tab by mouth two (2) times a day.  triamcinolone acetonide (KENALOG) 0.1 % topical cream Apply  to affected area two (2) times a day. use thin layer   Indications: CONTACT DERMATITIS    oxyCODONE IR (ROXICODONE) 20 mg immediate release tablet Take 20 mg by mouth every four (4) hours as needed.  loratadine (CLARITIN) 10 mg tablet Take 10 mg by mouth daily.  pantoprazole (PROTONIX) 40 mg tablet Take 40 mg by mouth daily.  QUEtiapine (SEROQUEL) 300 mg tablet Take 300 mg by mouth nightly.  sertraline (ZOLOFT) 100 mg tablet Take 200 mg by mouth nightly.  clonazePAM (KLONOPIN) 1 mg tablet Take 1 mg by mouth three (3) times daily as needed.  glimepiride (AMARYL) 2 mg tablet Take 2 mg by mouth daily (with breakfast).  albuterol (PROVENTIL HFA, VENTOLIN HFA, PROAIR HFA) 90 mcg/actuation inhaler Take 2 Puffs by inhalation every four (4) hours as needed for Wheezing.  CALCIUM CITRATE/VITAMIN D3 (CITRACAL + D PO) Take 1 Tab by mouth daily.  montelukast (SINGULAIR) 10 mg tablet Take 10 mg by mouth nightly.  ondansetron (ZOFRAN ODT) 8 mg disintegrating tablet Take 1 Tab by mouth every eight (8) hours as needed for Nausea.  amLODIPine (NORVASC) 10 mg tablet Take  by mouth daily. Disposition:  Admit=    Follow-up Information    None         Current Discharge Medication List            Core Measures:    Critical Care Time:   Critical Care Time:   I have spent 35 minutes of critical care time involved in lab review, consultations with specialist, family decision-making, and documentation. During this entire length of time I was immediately available to the patient.     Critical Care: The reason for providing this level of medical care for this critically ill patient was due a critical illness that impaired one or more vital organ systems such that there was a high probability of imminent or life threatening deterioration in the patients condition. This care involved high complexity decision making to assess, manipulate, and support vital system functions, to treat this degreee vital organ system failure and to prevent further life threatening deterioration of the patients condition. Diagnosis     Clinical Impression:   1. Severe persistent asthma with acute exacerbation    2.  Bronchitis

## 2019-10-09 LAB
ANION GAP SERPL CALC-SCNC: 8 MMOL/L (ref 3–18)
ATRIAL RATE: 87 BPM
BASOPHILS # BLD: 0 K/UL (ref 0–0.1)
BASOPHILS NFR BLD: 0 % (ref 0–2)
BUN SERPL-MCNC: 11 MG/DL (ref 7–18)
BUN/CREAT SERPL: 12 (ref 12–20)
CALCIUM SERPL-MCNC: 8.3 MG/DL (ref 8.5–10.1)
CALCULATED P AXIS, ECG09: 46 DEGREES
CALCULATED R AXIS, ECG10: 53 DEGREES
CALCULATED T AXIS, ECG11: 46 DEGREES
CHLORIDE SERPL-SCNC: 104 MMOL/L (ref 100–111)
CO2 SERPL-SCNC: 24 MMOL/L (ref 21–32)
CREAT SERPL-MCNC: 0.92 MG/DL (ref 0.6–1.3)
DIAGNOSIS, 93000: NORMAL
DIFFERENTIAL METHOD BLD: ABNORMAL
EOSINOPHIL # BLD: 0 K/UL (ref 0–0.4)
EOSINOPHIL NFR BLD: 0 % (ref 0–5)
ERYTHROCYTE [DISTWIDTH] IN BLOOD BY AUTOMATED COUNT: 18.2 % (ref 11.6–14.5)
GLUCOSE BLD STRIP.AUTO-MCNC: 297 MG/DL (ref 70–110)
GLUCOSE BLD STRIP.AUTO-MCNC: 336 MG/DL (ref 70–110)
GLUCOSE BLD STRIP.AUTO-MCNC: 363 MG/DL (ref 70–110)
GLUCOSE BLD STRIP.AUTO-MCNC: 422 MG/DL (ref 70–110)
GLUCOSE SERPL-MCNC: 301 MG/DL (ref 74–99)
HCT VFR BLD AUTO: 28.6 % (ref 35–45)
HGB BLD-MCNC: 8.5 G/DL (ref 12–16)
L PNEUMO AG UR QL IA: NEGATIVE
LYMPHOCYTES # BLD: 1.4 K/UL (ref 0.9–3.6)
LYMPHOCYTES NFR BLD: 14 % (ref 21–52)
MAGNESIUM SERPL-MCNC: 2.4 MG/DL (ref 1.6–2.6)
MCH RBC QN AUTO: 21.7 PG (ref 24–34)
MCHC RBC AUTO-ENTMCNC: 29.7 G/DL (ref 31–37)
MCV RBC AUTO: 73.1 FL (ref 74–97)
MONOCYTES # BLD: 0.2 K/UL (ref 0.05–1.2)
MONOCYTES NFR BLD: 2 % (ref 3–10)
NEUTS SEG # BLD: 8.9 K/UL (ref 1.8–8)
NEUTS SEG NFR BLD: 84 % (ref 40–73)
P-R INTERVAL, ECG05: 180 MS
PHOSPHATE SERPL-MCNC: 2.4 MG/DL (ref 2.5–4.9)
PLATELET # BLD AUTO: 338 K/UL (ref 135–420)
PMV BLD AUTO: 9.5 FL (ref 9.2–11.8)
POTASSIUM SERPL-SCNC: 4.9 MMOL/L (ref 3.5–5.5)
Q-T INTERVAL, ECG07: 406 MS
QRS DURATION, ECG06: 84 MS
QTC CALCULATION (BEZET), ECG08: 488 MS
RBC # BLD AUTO: 3.91 M/UL (ref 4.2–5.3)
S PNEUM AG UR QL: NEGATIVE
SODIUM SERPL-SCNC: 136 MMOL/L (ref 136–145)
VENTRICULAR RATE, ECG03: 87 BPM
WBC # BLD AUTO: 10.6 K/UL (ref 4.6–13.2)

## 2019-10-09 PROCEDURE — 94667 MNPJ CHEST WALL 1ST: CPT

## 2019-10-09 PROCEDURE — 74011250636 HC RX REV CODE- 250/636: Performed by: HOSPITALIST

## 2019-10-09 PROCEDURE — 87449 NOS EACH ORGANISM AG IA: CPT

## 2019-10-09 PROCEDURE — 87077 CULTURE AEROBIC IDENTIFY: CPT

## 2019-10-09 PROCEDURE — 83735 ASSAY OF MAGNESIUM: CPT

## 2019-10-09 PROCEDURE — 85025 COMPLETE CBC W/AUTO DIFF WBC: CPT

## 2019-10-09 PROCEDURE — 87450 LEGIONELLA PNEUMOPHILA AG, URINE: CPT

## 2019-10-09 PROCEDURE — 94668 MNPJ CHEST WALL SBSQ: CPT

## 2019-10-09 PROCEDURE — 74011250637 HC RX REV CODE- 250/637: Performed by: FAMILY MEDICINE

## 2019-10-09 PROCEDURE — 96376 TX/PRO/DX INJ SAME DRUG ADON: CPT

## 2019-10-09 PROCEDURE — 82962 GLUCOSE BLOOD TEST: CPT

## 2019-10-09 PROCEDURE — 87070 CULTURE OTHR SPECIMN AEROBIC: CPT

## 2019-10-09 PROCEDURE — 87186 SC STD MICRODIL/AGAR DIL: CPT

## 2019-10-09 PROCEDURE — 84100 ASSAY OF PHOSPHORUS: CPT

## 2019-10-09 PROCEDURE — 99218 HC RM OBSERVATION: CPT

## 2019-10-09 PROCEDURE — 80048 BASIC METABOLIC PNL TOTAL CA: CPT

## 2019-10-09 PROCEDURE — 74011636637 HC RX REV CODE- 636/637: Performed by: HOSPITALIST

## 2019-10-09 PROCEDURE — 77010033678 HC OXYGEN DAILY

## 2019-10-09 PROCEDURE — 36415 COLL VENOUS BLD VENIPUNCTURE: CPT

## 2019-10-09 PROCEDURE — 96372 THER/PROPH/DIAG INJ SC/IM: CPT

## 2019-10-09 PROCEDURE — 74011250637 HC RX REV CODE- 250/637: Performed by: HOSPITALIST

## 2019-10-09 PROCEDURE — 94761 N-INVAS EAR/PLS OXIMETRY MLT: CPT

## 2019-10-09 PROCEDURE — 94640 AIRWAY INHALATION TREATMENT: CPT

## 2019-10-09 PROCEDURE — 74011000250 HC RX REV CODE- 250: Performed by: HOSPITALIST

## 2019-10-09 RX ORDER — METFORMIN HYDROCHLORIDE 1000 MG/1
1000 TABLET ORAL 2 TIMES DAILY WITH MEALS
COMMUNITY

## 2019-10-09 RX ORDER — SIMVASTATIN 40 MG/1
TABLET, FILM COATED ORAL
Status: ON HOLD | COMMUNITY
End: 2019-10-09 | Stop reason: CLARIF

## 2019-10-09 RX ORDER — CETIRIZINE HCL 10 MG
10 TABLET ORAL DAILY
Status: DISCONTINUED | OUTPATIENT
Start: 2019-10-09 | End: 2019-10-17 | Stop reason: HOSPADM

## 2019-10-09 RX ORDER — GUAIFENESIN 100 MG/5ML
400 SOLUTION ORAL EVERY 4 HOURS
Status: DISCONTINUED | OUTPATIENT
Start: 2019-10-09 | End: 2019-10-09

## 2019-10-09 RX ORDER — OXYCODONE AND ACETAMINOPHEN 10; 325 MG/1; MG/1
1 TABLET ORAL
Status: DISCONTINUED | OUTPATIENT
Start: 2019-10-09 | End: 2019-10-11

## 2019-10-09 RX ORDER — INSULIN LISPRO 100 [IU]/ML
INJECTION, SOLUTION INTRAVENOUS; SUBCUTANEOUS 4 TIMES DAILY
Status: DISCONTINUED | OUTPATIENT
Start: 2019-10-09 | End: 2019-10-15 | Stop reason: ALTCHOICE

## 2019-10-09 RX ORDER — ERGOCALCIFEROL 1.25 MG/1
50000 CAPSULE ORAL 2 TIMES WEEKLY
COMMUNITY

## 2019-10-09 RX ORDER — NYSTATIN 100000 U/G
CREAM TOPICAL 2 TIMES DAILY
COMMUNITY

## 2019-10-09 RX ORDER — CYCLOBENZAPRINE HCL 10 MG
10 TABLET ORAL
COMMUNITY
End: 2020-02-12

## 2019-10-09 RX ORDER — HYDROXYZINE 25 MG/1
25 TABLET, FILM COATED ORAL
COMMUNITY
End: 2019-10-17

## 2019-10-09 RX ORDER — CETIRIZINE HCL 10 MG
10 TABLET ORAL DAILY
COMMUNITY

## 2019-10-09 RX ORDER — ASPIRIN 81 MG/1
81 TABLET ORAL DAILY
COMMUNITY

## 2019-10-09 RX ORDER — HYDROXYZINE 25 MG/1
25 TABLET, FILM COATED ORAL
COMMUNITY

## 2019-10-09 RX ORDER — HYDROXYZINE 25 MG/1
25 TABLET, FILM COATED ORAL
Status: DISCONTINUED | OUTPATIENT
Start: 2019-10-09 | End: 2019-10-17 | Stop reason: HOSPADM

## 2019-10-09 RX ORDER — INSULIN GLARGINE 100 [IU]/ML
10 INJECTION, SOLUTION SUBCUTANEOUS DAILY
Status: DISCONTINUED | OUTPATIENT
Start: 2019-10-09 | End: 2019-10-10

## 2019-10-09 RX ORDER — NALOXONE HYDROCHLORIDE 4 MG/.1ML
1 SPRAY NASAL
COMMUNITY

## 2019-10-09 RX ORDER — ATORVASTATIN CALCIUM 40 MG/1
40 TABLET, FILM COATED ORAL DAILY
COMMUNITY

## 2019-10-09 RX ORDER — GUAIFENESIN 100 MG/5ML
400 SOLUTION ORAL EVERY 4 HOURS
Status: DISCONTINUED | OUTPATIENT
Start: 2019-10-09 | End: 2019-10-11

## 2019-10-09 RX ORDER — MAGNESIUM CITRATE
296 SOLUTION, ORAL ORAL
Status: COMPLETED | OUTPATIENT
Start: 2019-10-09 | End: 2019-10-09

## 2019-10-09 RX ORDER — CYCLOBENZAPRINE HCL 10 MG
10 TABLET ORAL
Status: DISCONTINUED | OUTPATIENT
Start: 2019-10-09 | End: 2019-10-17 | Stop reason: HOSPADM

## 2019-10-09 RX ADMIN — IPRATROPIUM BROMIDE AND ALBUTEROL SULFATE 3 ML: .5; 3 SOLUTION RESPIRATORY (INHALATION) at 03:58

## 2019-10-09 RX ADMIN — GUAIFENESIN 100 MG: 200 SOLUTION ORAL at 10:38

## 2019-10-09 RX ADMIN — CLONAZEPAM 1 MG: 0.5 TABLET ORAL at 22:05

## 2019-10-09 RX ADMIN — PANTOPRAZOLE SODIUM 40 MG: 40 TABLET, DELAYED RELEASE ORAL at 08:38

## 2019-10-09 RX ADMIN — IPRATROPIUM BROMIDE AND ALBUTEROL SULFATE 3 ML: .5; 3 SOLUTION RESPIRATORY (INHALATION) at 20:40

## 2019-10-09 RX ADMIN — QUETIAPINE FUMARATE 300 MG: 300 TABLET ORAL at 22:04

## 2019-10-09 RX ADMIN — METHYLPREDNISOLONE SODIUM SUCCINATE 60 MG: 125 INJECTION, POWDER, FOR SOLUTION INTRAMUSCULAR; INTRAVENOUS at 08:36

## 2019-10-09 RX ADMIN — DOXYCYCLINE 100 MG: 100 CAPSULE ORAL at 08:38

## 2019-10-09 RX ADMIN — IPRATROPIUM BROMIDE AND ALBUTEROL SULFATE 3 ML: .5; 3 SOLUTION RESPIRATORY (INHALATION) at 00:44

## 2019-10-09 RX ADMIN — CLONAZEPAM 1 MG: 0.5 TABLET ORAL at 16:33

## 2019-10-09 RX ADMIN — IPRATROPIUM BROMIDE AND ALBUTEROL SULFATE 3 ML: .5; 3 SOLUTION RESPIRATORY (INHALATION) at 13:21

## 2019-10-09 RX ADMIN — IPRATROPIUM BROMIDE AND ALBUTEROL SULFATE 3 ML: .5; 3 SOLUTION RESPIRATORY (INHALATION) at 09:28

## 2019-10-09 RX ADMIN — CETIRIZINE HYDROCHLORIDE 10 MG: 10 TABLET, FILM COATED ORAL at 16:26

## 2019-10-09 RX ADMIN — DOXYCYCLINE 100 MG: 100 CAPSULE ORAL at 22:05

## 2019-10-09 RX ADMIN — GABAPENTIN 600 MG: 300 CAPSULE ORAL at 16:26

## 2019-10-09 RX ADMIN — OXYCODONE HYDROCHLORIDE AND ACETAMINOPHEN 1 TABLET: 10; 325 TABLET ORAL at 08:38

## 2019-10-09 RX ADMIN — INSULIN LISPRO 12 UNITS: 100 INJECTION, SOLUTION INTRAVENOUS; SUBCUTANEOUS at 17:20

## 2019-10-09 RX ADMIN — SERTRALINE HYDROCHLORIDE 100 MG: 50 TABLET ORAL at 08:39

## 2019-10-09 RX ADMIN — HEPARIN SODIUM 5000 UNITS: 5000 INJECTION INTRAVENOUS; SUBCUTANEOUS at 22:05

## 2019-10-09 RX ADMIN — INSULIN GLARGINE 10 UNITS: 100 INJECTION, SOLUTION SUBCUTANEOUS at 12:16

## 2019-10-09 RX ADMIN — OXYCODONE HYDROCHLORIDE AND ACETAMINOPHEN 1 TABLET: 10; 325 TABLET ORAL at 20:25

## 2019-10-09 RX ADMIN — GUAIFENESIN 400 MG: 100 SOLUTION ORAL at 21:23

## 2019-10-09 RX ADMIN — HEPARIN SODIUM 5000 UNITS: 5000 INJECTION INTRAVENOUS; SUBCUTANEOUS at 06:25

## 2019-10-09 RX ADMIN — GABAPENTIN 600 MG: 300 CAPSULE ORAL at 08:38

## 2019-10-09 RX ADMIN — INSULIN LISPRO 6 UNITS: 100 INJECTION, SOLUTION INTRAVENOUS; SUBCUTANEOUS at 08:34

## 2019-10-09 RX ADMIN — HEPARIN SODIUM 5000 UNITS: 5000 INJECTION INTRAVENOUS; SUBCUTANEOUS at 14:00

## 2019-10-09 RX ADMIN — MAGESIUM CITRATE 296 ML: 1.75 LIQUID ORAL at 21:00

## 2019-10-09 RX ADMIN — GUAIFENESIN 400 MG: 100 SOLUTION ORAL at 12:16

## 2019-10-09 RX ADMIN — INSULIN LISPRO 15 UNITS: 100 INJECTION, SOLUTION INTRAVENOUS; SUBCUTANEOUS at 12:17

## 2019-10-09 RX ADMIN — SERTRALINE HYDROCHLORIDE 150 MG: 50 TABLET ORAL at 22:04

## 2019-10-09 RX ADMIN — GABAPENTIN 600 MG: 300 CAPSULE ORAL at 22:05

## 2019-10-09 RX ADMIN — INSULIN LISPRO 15 UNITS: 100 INJECTION, SOLUTION INTRAVENOUS; SUBCUTANEOUS at 23:16

## 2019-10-09 RX ADMIN — AMLODIPINE BESYLATE 10 MG: 5 TABLET ORAL at 08:38

## 2019-10-09 RX ADMIN — METHYLPREDNISOLONE SODIUM SUCCINATE 60 MG: 125 INJECTION, POWDER, FOR SOLUTION INTRAMUSCULAR; INTRAVENOUS at 16:00

## 2019-10-09 RX ADMIN — GUAIFENESIN 400 MG: 100 SOLUTION ORAL at 16:26

## 2019-10-09 RX ADMIN — MONTELUKAST 10 MG: 10 TABLET, FILM COATED ORAL at 22:05

## 2019-10-09 RX ADMIN — IPRATROPIUM BROMIDE AND ALBUTEROL SULFATE 3 ML: .5; 3 SOLUTION RESPIRATORY (INHALATION) at 16:48

## 2019-10-09 RX ADMIN — OXYCODONE HYDROCHLORIDE AND ACETAMINOPHEN 1 TABLET: 10; 325 TABLET ORAL at 14:08

## 2019-10-09 NOTE — PROGRESS NOTES
5838: Patient on 2 lpm Nasal Cannula. SpO2 98%. Patient performed 10 breaths via Acapella. Patient performed well.     8813: Patient liter flow increased from 2 lpm to 4 lpm to reduce patient's WOB and SOB.  Patient got up to go to the restroom prior to RT arrival to room and patient displayed dyspnea after exertion and at rest.

## 2019-10-09 NOTE — PROGRESS NOTES
PTA medication list received from [facility] via fax per request. List updated in our records for MD review. There phone number is [phone]. Fax [phone].

## 2019-10-09 NOTE — PROGRESS NOTES
Problem: Diabetes Self-Management  Goal: *Disease process and treatment process  Description  Define diabetes and identify own type of diabetes; list 3 options for treating diabetes. Outcome: Progressing Towards Goal  Goal: *Incorporating nutritional management into lifestyle  Description  Describe effect of type, amount and timing of food on blood glucose; list 3 methods for planning meals. Outcome: Progressing Towards Goal  Goal: *Incorporating physical activity into lifestyle  Description  State effect of exercise on blood glucose levels. Outcome: Progressing Towards Goal  Goal: *Developing strategies to promote health/change behavior  Description  Define the ABC's of diabetes; identify appropriate screenings, schedule and personal plan for screenings. Outcome: Progressing Towards Goal  Goal: *Using medications safely  Description  State effect of diabetes medications on diabetes; name diabetes medication taking, action and side effects. Outcome: Progressing Towards Goal  Goal: *Monitoring blood glucose, interpreting and using results  Description  Identify recommended blood glucose targets  and personal targets. Outcome: Progressing Towards Goal  Goal: *Prevention, detection, treatment of acute complications  Description  List symptoms of hyper- and hypoglycemia; describe how to treat low blood sugar and actions for lowering  high blood glucose level. Outcome: Progressing Towards Goal  Goal: *Prevention, detection and treatment of chronic complications  Description  Define the natural course of diabetes and describe the relationship of blood glucose levels to long term complications of diabetes.   Outcome: Progressing Towards Goal  Goal: *Developing strategies to address psychosocial issues  Description  Describe feelings about living with diabetes; identify support needed and support network  Outcome: Progressing Towards Goal     Problem: Pain  Goal: *Control of Pain  Outcome: Progressing Towards Goal Problem: Chronic Obstructive Pulmonary Disease (COPD)  Goal: *Oxygen saturation during activity within specified parameters  Outcome: Progressing Towards Goal  Goal: *Able to remain out of bed as prescribed  Outcome: Progressing Towards Goal  Goal: *Absence of hypoxia  Outcome: Progressing Towards Goal  Goal: *Optimize nutritional status  Outcome: Progressing Towards Goal     Problem: Falls - Risk of  Goal: *Absence of Falls  Description  Document Ivar Du Fall Risk and appropriate interventions in the flowsheet.   Outcome: Progressing Towards Goal  Note:   Fall Risk Interventions:            Medication Interventions: Evaluate medications/consider consulting pharmacy, Patient to call before getting OOB, Teach patient to arise slowly

## 2019-10-09 NOTE — PROGRESS NOTES
Progress Note      Patient: Antwan Forte               Sex: female          DOA: 10/8/2019       YOB: 1960      Age:  64 y.o.        LOS:  LOS: 0 days             CHIEF COMPLAINT:  Cough and Lethargy      Assessment/Plan:     Principal Problem:    Acute respiratory failure with hypoxemia ([facility] (10/8/2019)    Active Problems:    Obesity (11/15/2012)      COPD exacerbation ([facility] (10/8/2019)      DM (diabetes mellitus) ([facility] (10/8/2019)        PLAN:  COPD exacerbation  Cont steroids, nebulized bronchodilators, antibiotics  Start chest PT  Check sputum culture and urine antigens    Diabetes mellitus, type 2  Cont home lantus   Cont sliding scale insulin with glycemic control protocol    DVT ppx    Subjective:     Pt states she feels worse, coughing persistently, still wheezing    Objective:     Visit Vitals  /62   Pulse 84   Temp 98.6 °F (37 °C)   Resp 24   Ht 5' 7\" (1.702 m)   Wt 113.4 kg (250 lb)   SpO2 100%   Breastfeeding? No   BMI 39.16 kg/m²        Physical Exam:  Ears: hearing is intact  Eyes: Icterus is not present  Lungs: diffuse wheezes, coughing  Heart: regular rate and rhythm, S1, S2 normal, no murmur, click, rub or gallop  Gastrointestinal: soft, non-tender.  Bowel sounds normal. No masses,  no organomegaly  Neurological:  New Focal Deficits are not present  Psychiatric:  Mood is stable    Lab/Data Reviewed:  CMP:   Lab Results   Component Value Date/Time     10/09/2019 04:35 AM    K 4.9 10/09/2019 04:35 AM     10/09/2019 04:35 AM    CO2 24 10/09/2019 04:35 AM    AGAP 8 10/09/2019 04:35 AM     (H) 10/09/2019 04:35 AM    BUN 11 10/09/2019 04:35 AM    CREA 0.92 10/09/2019 04:35 AM    GFRAA >60 10/09/2019 04:35 AM    GFRNA >60 10/09/2019 04:35 AM    CA 8.3 (L) 10/09/2019 04:35 AM    MG 2.4 10/09/2019 04:35 AM    PHOS 2.4 (L) 10/09/2019 04:35 AM    ALB 3.4 10/08/2019 03:44 PM    TP 7.1 10/08/2019 03:44 PM    GLOB 3.7 10/08/2019 03:44 PM    AGRAT 0.9 10/08/2019 03:44 PM    SGOT 31 10/08/2019 03:44 PM    ALT 20 10/08/2019 03:44 PM     CBC:   Lab Results   Component Value Date/Time    WBC 10.6 10/09/2019 04:35 AM    HGB 8.5 (L) 10/09/2019 04:35 AM    HCT 28.6 (L) 10/09/2019 04:35 AM     10/09/2019 04:35 AM     All Cardiac Markers in the last 24 hours:   Lab Results   Component Value Date/Time    TROIQ <0.02 10/08/2019 03:44 PM     Recent Glucose Results:   Lab Results   Component Value Date/Time     (H) 10/09/2019 04:35 AM     (H) 10/08/2019 03:44 PM       Imaging Reviewed:  Xr Chest Pa Lat    Result Date: 10/8/2019  EXAM: Two-view chest CLINICAL HISTORY: Short of breath , COMPARISON: None FINDINGS: Frontal and lateral views of the chest demonstrate coarse interstitial markings likely related to some areas of minor scarring. . Cardiac silhouette is normal in size and contour. No acute bony or soft tissue abnormality. IMPRESSION: No significant interval change or acute pulmonary process identified.

## 2019-10-09 NOTE — PROGRESS NOTES
Respiratory Therapy Assessment Care Plan    Patient:  Gerson Collins 64 y.o. female 10/9/2019 1:35 PM    COPD exacerbation ([address] [J44.1]      Chest X-RAY:   Results from Hospital Encounter encounter on 10/08/19   XR CHEST PA LAT    Impression IMPRESSION:    No significant interval change or acute pulmonary process identified. Results from [place] encounter on 05/31/16   XR KNEE RT MAX 2 VWS    Impression IMPRESSION:    Right total knee arthroplasty in good anatomic alignment. Results from [place] encounter on 05/26/13   XR ABD FLAT/ ERECT    Impression IMPRESSION: No acute findings in the chest or abdomen                  Vital Signs:   Visit Vitals  /60 (BP 1 Location: Right arm, BP Patient Position: At rest)   Pulse 75   Temp 98 °F (36.7 °C)   Resp 20   Ht 5' 7\" (1.702 m)   Wt 113.4 kg (250 lb)   SpO2 99%   Breastfeeding? No   BMI 39.16 kg/m²           Indications for treatment: History of Asthma and COPD      Plan of care: Duoneb Q4H and CPT via Acapella Q4H. Continue to improve and maintain oxygenation and ventilation. Continue oxygen therapy via Nasal Cannula. Goal: Titrate oxygen as tolerated per patient.

## 2019-10-09 NOTE — PROGRESS NOTES
Problem: Chronic Obstructive Pulmonary Disease (COPD)  Goal: *Oxygen saturation during activity within specified parameters  Outcome: Progressing Towards Goal  Goal: *Absence of hypoxia  Outcome: Progressing Towards Goal   Respiratory Therapy Assessment Care Plan    Patient:  Js Solis 64 y.o. female 10/9/2019 1:43 AM    COPD exacerbation ([address] [J44.1]      Chest X-RAY:   Results from Hospital Encounter encounter on 10/08/19   XR CHEST PA LAT    Impression IMPRESSION:    No significant interval change or acute pulmonary process identified. Results from [place] encounter on 05/31/16   XR KNEE RT MAX 2 VWS    Impression IMPRESSION:    Right total knee arthroplasty in good anatomic alignment.      Results from [place] encounter on 05/26/13   XR ABD FLAT/ ERECT    Impression IMPRESSION: No acute findings in the chest or abdomen                  Vital Signs:   Visit Vitals  /63 (BP 1 Location: Right arm, BP Patient Position: At rest)   Pulse 97   Temp 97.5 °F (36.4 °C)   Resp 20   Ht 5' 7\" (1.702 m)   Wt 113.4 kg (250 lb)   SpO2 99%   BMI 39.16 kg/m²           Indications for treatment: Asthma/ COPD      Plan of care: Bronchodilators, Oxygen Therapy        Goal:Decrease work of breathing

## 2019-10-09 NOTE — PROGRESS NOTES
Problem: Discharge Planning  Goal: *Discharge to safe environment  Outcome: Progressing Towards Goal    Home / [facility] COPD    Care Management Interventions  PCP Verified by CM: Yes  Palliative Care Criteria Met (RRAT>21 & CHF Dx)?: No  Transition of Care Consult (CM Consult): Home Health(h2h)  Current Support Network: Other(DtR and son in law)  Confirm Follow Up Transport: Family  Plan discussed with Pt/Family/Caregiver: Yes  Discharge Location  Discharge Placement: Home with home health([facility] COPD)       Reason for Admission:   Acute Resp failure with Hypoxemia                  RRAT Score:     16             Do you (patient/family) have any concerns for transition/discharge?  none                 Plan for utilizing home health:   Qualifies for [facility] COPD    Current Advanced Directive/Advance Care Plan:  Not on file and not interested at this time. Transition of Care Plan:   Spoke with patient in room, She is independent with her care and has a walker. She verified her demographics, insurance and PCP (Dr. Lisbet Yin). as correct on the facesheet. Patient has designated Reagan [phone]  DTR and Collins Parker [phone] ______________________ to participate in his/her discharge plan and to receive any needed information. Plan is to return home with family at this time. May need walk test prior to D/C. Patient and/or next of kin has been given and has signed the [facility] Outpatient Observation  Notification letter and all questions answered. Copy of this notice given to patient and copy placed on chart. Patient and/or next of kin has been given the Outpatient Observation Information and Notification letter and all questions answered. Patient and/or next of kin has been given the Outpatient Observation Information and Notification letter and all questions answered.

## 2019-10-09 NOTE — PROGRESS NOTES
Problem: Diabetes Self-Management  Goal: *Disease process and treatment process  Description  Define diabetes and identify own type of diabetes; list 3 options for treating diabetes. Outcome: Progressing Towards Goal  Goal: *Monitoring blood glucose, interpreting and using results  Description  Identify recommended blood glucose targets  and personal targets. Outcome: Progressing Towards Goal  Goal: *Prevention, detection and treatment of chronic complications  Description  Define the natural course of diabetes and describe the relationship of blood glucose levels to long term complications of diabetes.   Outcome: Progressing Towards Goal     Problem: Patient Education: Go to Patient Education Activity  Goal: Patient/Family Education  Outcome: Progressing Towards Goal     Problem: Pain  Goal: *Control of Pain  Outcome: Progressing Towards Goal     Problem: Chronic Obstructive Pulmonary Disease (COPD)  Goal: *Oxygen saturation during activity within specified parameters  Outcome: Progressing Towards Goal  Goal: *Able to remain out of bed as prescribed  Outcome: Progressing Towards Goal  Goal: *Absence of hypoxia  Outcome: Progressing Towards Goal  Goal: *Optimize nutritional status  Outcome: Progressing Towards Goal

## 2019-10-09 NOTE — PROGRESS NOTES
conducted an initial consultation and Spiritual Assessment for Emmett Rivers, who is a 64 y.o.,female. Patients Primary Language is: [place]. According to the patients EMR Jainism Affiliation is: [facility].     The reason the Patient came to the hospital is:   Patient Active Problem List    Diagnosis Date Noted    COPD exacerbation ([facility] 10/08/2019    DM (diabetes mellitus) ([facility] 10/08/2019    Acute respiratory failure with hypoxemia ([facility] 10/08/2019    PTSD (post-traumatic stress disorder) 06/02/2016    Depression 06/02/2016    Encephalopathy 06/01/2016    Osteoarthritis of right knee 05/30/2016    Tobacco abuse disorder 03/18/2013    Hand pain 11/15/2012    Knee pain, bilateral 11/15/2012    Arthralgia 11/15/2012    Encounter for long-term (current) use of other medications 11/15/2012    Chronic pain syndrome 11/15/2012    Obesity 11/15/2012    Generalized OA 11/15/2012    History of depression 11/15/2012    LUCIA (generalized anxiety disorder) 11/15/2012    History of substance abuse ([facility] 11/15/2012        The  provided the following Interventions:  Initiated a relationship of care and support with patient in room 3035 this morning and found patient setting on the side of bed. Listened as patient shared her story and struggles with breathing and her problems. Provided information about Spiritual Care Services. Offered prayer and assurance of continued prayers on patients behalf. The following outcomes were achieved:  Patient shared limited information about medical narrative and spiritual journey/beliefs. Patient processed feeling about current hospitalization. Patient expressed gratitude for pastoral care visit. Assessment:  Patient does not have any Episcopal/cultural needs that will affect patients preferences in health care. There are no further spiritual or Episcopal issues which require Spiritual Care Services interventions at this time. Plan:  Chaplains will continue to follow and will provide pastoral care on an as needed/requested basis    . Shae January   Spiritual Care   [phone]

## 2019-10-09 NOTE — ROUTINE PROCESS
Admit pt from ED via stretcher. Pt awake, alert and oriented X 3. Patient has productive cough & wheezing. O2 at 2L/NC. Family at the bedside. No verbal distress noted. Bed in lowest position & wheels locked. Call bell within reach. 10/9/19  
 
0010 - Patient has productive cough right after breathing treatments. 0400 - Ambulates to BR with steady gait. Pt has been asking for juice and crackers. 0730  -  Bedside and Verbal shift change report given to Jazmyn Toledo (oncoming nurse) by Jadiel Welsh RN BSN ( off going Nurse ) inclusive of SBAR and plan of care, Intake & Output.

## 2019-10-09 NOTE — ROUTINE PROCESS
1913 Bedside and Verbal shift change report given to Abi Mendez (oncoming nurse) by Malva Libman RN (offgoing nurse). Report included the following information SBAR, Kardex, Intake/Output, MAR, Recent Results and Cardiac Rhythm non-telemetry.

## 2019-10-10 PROBLEM — J45.901 ASTHMA EXACERBATION: Status: ACTIVE | Noted: 2019-10-10

## 2019-10-10 LAB
ANION GAP SERPL CALC-SCNC: 6 MMOL/L (ref 3–18)
BASOPHILS # BLD: 0 K/UL (ref 0–0.1)
BASOPHILS NFR BLD: 0 % (ref 0–2)
BUN SERPL-MCNC: 18 MG/DL (ref 7–18)
BUN/CREAT SERPL: 22 (ref 12–20)
CALCIUM SERPL-MCNC: 8.9 MG/DL (ref 8.5–10.1)
CHLORIDE SERPL-SCNC: 102 MMOL/L (ref 100–111)
CO2 SERPL-SCNC: 27 MMOL/L (ref 21–32)
CREAT SERPL-MCNC: 0.81 MG/DL (ref 0.6–1.3)
DIFFERENTIAL METHOD BLD: ABNORMAL
EOSINOPHIL # BLD: 0 K/UL (ref 0–0.4)
EOSINOPHIL NFR BLD: 0 % (ref 0–5)
ERYTHROCYTE [DISTWIDTH] IN BLOOD BY AUTOMATED COUNT: 18 % (ref 11.6–14.5)
GLUCOSE BLD STRIP.AUTO-MCNC: 421 MG/DL (ref 70–110)
GLUCOSE BLD STRIP.AUTO-MCNC: 423 MG/DL (ref 70–110)
GLUCOSE BLD STRIP.AUTO-MCNC: 447 MG/DL (ref 70–110)
GLUCOSE BLD STRIP.AUTO-MCNC: 568 MG/DL (ref 70–110)
GLUCOSE SERPL-MCNC: 313 MG/DL (ref 74–99)
HCT VFR BLD AUTO: 28 % (ref 35–45)
HGB BLD-MCNC: 8.3 G/DL (ref 12–16)
LYMPHOCYTES # BLD: 2.2 K/UL (ref 0.9–3.6)
LYMPHOCYTES NFR BLD: 12 % (ref 21–52)
MAGNESIUM SERPL-MCNC: 2.8 MG/DL (ref 1.6–2.6)
MCH RBC QN AUTO: 21.7 PG (ref 24–34)
MCHC RBC AUTO-ENTMCNC: 29.6 G/DL (ref 31–37)
MCV RBC AUTO: 73.3 FL (ref 74–97)
MONOCYTES # BLD: 0.8 K/UL (ref 0.05–1.2)
MONOCYTES NFR BLD: 4 % (ref 3–10)
NEUTS SEG # BLD: 16.4 K/UL (ref 1.8–8)
NEUTS SEG NFR BLD: 84 % (ref 40–73)
PHOSPHATE SERPL-MCNC: 1.8 MG/DL (ref 2.5–4.9)
PLATELET # BLD AUTO: 343 K/UL (ref 135–420)
PMV BLD AUTO: 9.9 FL (ref 9.2–11.8)
POTASSIUM SERPL-SCNC: 5.2 MMOL/L (ref 3.5–5.5)
RBC # BLD AUTO: 3.82 M/UL (ref 4.2–5.3)
SODIUM SERPL-SCNC: 135 MMOL/L (ref 136–145)
WBC # BLD AUTO: 19.4 K/UL (ref 4.6–13.2)

## 2019-10-10 PROCEDURE — 74011250637 HC RX REV CODE- 250/637: Performed by: HOSPITALIST

## 2019-10-10 PROCEDURE — 36415 COLL VENOUS BLD VENIPUNCTURE: CPT

## 2019-10-10 PROCEDURE — 99218 HC RM OBSERVATION: CPT

## 2019-10-10 PROCEDURE — 83735 ASSAY OF MAGNESIUM: CPT

## 2019-10-10 PROCEDURE — 74011250636 HC RX REV CODE- 250/636: Performed by: HOSPITALIST

## 2019-10-10 PROCEDURE — 85025 COMPLETE CBC W/AUTO DIFF WBC: CPT

## 2019-10-10 PROCEDURE — 94668 MNPJ CHEST WALL SBSQ: CPT

## 2019-10-10 PROCEDURE — 74011250637 HC RX REV CODE- 250/637: Performed by: FAMILY MEDICINE

## 2019-10-10 PROCEDURE — 74011636637 HC RX REV CODE- 636/637: Performed by: HOSPITALIST

## 2019-10-10 PROCEDURE — 94664 DEMO&/EVAL PT USE INHALER: CPT

## 2019-10-10 PROCEDURE — 65270000029 HC RM PRIVATE

## 2019-10-10 PROCEDURE — 96372 THER/PROPH/DIAG INJ SC/IM: CPT

## 2019-10-10 PROCEDURE — 80048 BASIC METABOLIC PNL TOTAL CA: CPT

## 2019-10-10 PROCEDURE — 96376 TX/PRO/DX INJ SAME DRUG ADON: CPT

## 2019-10-10 PROCEDURE — 94761 N-INVAS EAR/PLS OXIMETRY MLT: CPT

## 2019-10-10 PROCEDURE — 84100 ASSAY OF PHOSPHORUS: CPT

## 2019-10-10 PROCEDURE — 94640 AIRWAY INHALATION TREATMENT: CPT

## 2019-10-10 PROCEDURE — 77010033678 HC OXYGEN DAILY

## 2019-10-10 PROCEDURE — 74011000250 HC RX REV CODE- 250: Performed by: HOSPITALIST

## 2019-10-10 PROCEDURE — 82962 GLUCOSE BLOOD TEST: CPT

## 2019-10-10 RX ORDER — CODEINE PHOSPHATE AND GUAIFENESIN 10; 100 MG/5ML; MG/5ML
10 SOLUTION ORAL
Status: DISCONTINUED | OUTPATIENT
Start: 2019-10-10 | End: 2019-10-11

## 2019-10-10 RX ORDER — INSULIN GLARGINE 100 [IU]/ML
20 INJECTION, SOLUTION SUBCUTANEOUS DAILY
Status: DISCONTINUED | OUTPATIENT
Start: 2019-10-11 | End: 2019-10-11

## 2019-10-10 RX ORDER — ALBUTEROL SULFATE 0.83 MG/ML
2.5 SOLUTION RESPIRATORY (INHALATION)
Status: DISCONTINUED | OUTPATIENT
Start: 2019-10-10 | End: 2019-10-17 | Stop reason: HOSPADM

## 2019-10-10 RX ORDER — NALOXONE HYDROCHLORIDE 0.4 MG/ML
0.4 INJECTION, SOLUTION INTRAMUSCULAR; INTRAVENOUS; SUBCUTANEOUS AS NEEDED
Status: DISCONTINUED | OUTPATIENT
Start: 2019-10-10 | End: 2019-10-17 | Stop reason: HOSPADM

## 2019-10-10 RX ORDER — INSULIN GLARGINE 100 [IU]/ML
10 INJECTION, SOLUTION SUBCUTANEOUS
Status: COMPLETED | OUTPATIENT
Start: 2019-10-10 | End: 2019-10-10

## 2019-10-10 RX ORDER — OXYCODONE HYDROCHLORIDE 5 MG/1
30 TABLET ORAL ONCE
Status: COMPLETED | OUTPATIENT
Start: 2019-10-11 | End: 2019-10-10

## 2019-10-10 RX ADMIN — HEPARIN SODIUM 5000 UNITS: 5000 INJECTION INTRAVENOUS; SUBCUTANEOUS at 15:36

## 2019-10-10 RX ADMIN — INSULIN GLARGINE 10 UNITS: 100 INJECTION, SOLUTION SUBCUTANEOUS at 08:36

## 2019-10-10 RX ADMIN — IPRATROPIUM BROMIDE AND ALBUTEROL SULFATE 3 ML: .5; 3 SOLUTION RESPIRATORY (INHALATION) at 03:25

## 2019-10-10 RX ADMIN — CLONAZEPAM 1 MG: 0.5 TABLET ORAL at 18:19

## 2019-10-10 RX ADMIN — OXYCODONE HYDROCHLORIDE AND ACETAMINOPHEN 1 TABLET: 10; 325 TABLET ORAL at 08:43

## 2019-10-10 RX ADMIN — AMLODIPINE BESYLATE 10 MG: 5 TABLET ORAL at 08:35

## 2019-10-10 RX ADMIN — IPRATROPIUM BROMIDE AND ALBUTEROL SULFATE 3 ML: .5; 3 SOLUTION RESPIRATORY (INHALATION) at 00:03

## 2019-10-10 RX ADMIN — INSULIN GLARGINE 10 UNITS: 100 INJECTION, SOLUTION SUBCUTANEOUS at 12:00

## 2019-10-10 RX ADMIN — METHYLPREDNISOLONE SODIUM SUCCINATE 60 MG: 125 INJECTION, POWDER, FOR SOLUTION INTRAMUSCULAR; INTRAVENOUS at 08:35

## 2019-10-10 RX ADMIN — INSULIN LISPRO 15 UNITS: 100 INJECTION, SOLUTION INTRAVENOUS; SUBCUTANEOUS at 08:34

## 2019-10-10 RX ADMIN — HEPARIN SODIUM 5000 UNITS: 5000 INJECTION INTRAVENOUS; SUBCUTANEOUS at 21:51

## 2019-10-10 RX ADMIN — HYDROXYZINE HYDROCHLORIDE 25 MG: 25 TABLET, FILM COATED ORAL at 20:31

## 2019-10-10 RX ADMIN — OXYCODONE HYDROCHLORIDE AND ACETAMINOPHEN 1 TABLET: 10; 325 TABLET ORAL at 18:19

## 2019-10-10 RX ADMIN — GABAPENTIN 600 MG: 300 CAPSULE ORAL at 08:35

## 2019-10-10 RX ADMIN — GUAIFENESIN AND CODEINE PHOSPHATE 10 ML: 100; 10 SOLUTION ORAL at 20:17

## 2019-10-10 RX ADMIN — PANTOPRAZOLE SODIUM 40 MG: 40 TABLET, DELAYED RELEASE ORAL at 08:35

## 2019-10-10 RX ADMIN — QUETIAPINE FUMARATE 300 MG: 300 TABLET ORAL at 21:51

## 2019-10-10 RX ADMIN — GUAIFENESIN 400 MG: 100 SOLUTION ORAL at 21:51

## 2019-10-10 RX ADMIN — METHYLPREDNISOLONE SODIUM SUCCINATE 60 MG: 125 INJECTION, POWDER, FOR SOLUTION INTRAMUSCULAR; INTRAVENOUS at 01:14

## 2019-10-10 RX ADMIN — GABAPENTIN 600 MG: 300 CAPSULE ORAL at 21:51

## 2019-10-10 RX ADMIN — GUAIFENESIN 400 MG: 100 SOLUTION ORAL at 13:49

## 2019-10-10 RX ADMIN — IPRATROPIUM BROMIDE AND ALBUTEROL SULFATE 3 ML: .5; 3 SOLUTION RESPIRATORY (INHALATION) at 23:47

## 2019-10-10 RX ADMIN — INSULIN LISPRO 15 UNITS: 100 INJECTION, SOLUTION INTRAVENOUS; SUBCUTANEOUS at 22:52

## 2019-10-10 RX ADMIN — IPRATROPIUM BROMIDE AND ALBUTEROL SULFATE 3 ML: .5; 3 SOLUTION RESPIRATORY (INHALATION) at 19:56

## 2019-10-10 RX ADMIN — OXYCODONE HYDROCHLORIDE 30 MG: 5 TABLET ORAL at 23:29

## 2019-10-10 RX ADMIN — METHYLPREDNISOLONE SODIUM SUCCINATE 60 MG: 125 INJECTION, POWDER, FOR SOLUTION INTRAMUSCULAR; INTRAVENOUS at 15:36

## 2019-10-10 RX ADMIN — GUAIFENESIN 400 MG: 100 SOLUTION ORAL at 18:19

## 2019-10-10 RX ADMIN — DOXYCYCLINE 100 MG: 100 CAPSULE ORAL at 08:35

## 2019-10-10 RX ADMIN — IPRATROPIUM BROMIDE AND ALBUTEROL SULFATE 3 ML: .5; 3 SOLUTION RESPIRATORY (INHALATION) at 12:03

## 2019-10-10 RX ADMIN — DOXYCYCLINE 100 MG: 100 CAPSULE ORAL at 21:51

## 2019-10-10 RX ADMIN — INSULIN LISPRO 15 UNITS: 100 INJECTION, SOLUTION INTRAVENOUS; SUBCUTANEOUS at 15:36

## 2019-10-10 RX ADMIN — IPRATROPIUM BROMIDE AND ALBUTEROL SULFATE 3 ML: .5; 3 SOLUTION RESPIRATORY (INHALATION) at 15:34

## 2019-10-10 RX ADMIN — OXYCODONE HYDROCHLORIDE AND ACETAMINOPHEN 1 TABLET: 10; 325 TABLET ORAL at 02:41

## 2019-10-10 RX ADMIN — GUAIFENESIN 400 MG: 100 SOLUTION ORAL at 01:14

## 2019-10-10 RX ADMIN — GUAIFENESIN AND CODEINE PHOSPHATE 10 ML: 100; 10 SOLUTION ORAL at 15:36

## 2019-10-10 RX ADMIN — METHYLPREDNISOLONE SODIUM SUCCINATE 60 MG: 125 INJECTION, POWDER, FOR SOLUTION INTRAMUSCULAR; INTRAVENOUS at 23:29

## 2019-10-10 RX ADMIN — GABAPENTIN 600 MG: 300 CAPSULE ORAL at 15:36

## 2019-10-10 RX ADMIN — CETIRIZINE HYDROCHLORIDE 10 MG: 10 TABLET, FILM COATED ORAL at 08:36

## 2019-10-10 RX ADMIN — IPRATROPIUM BROMIDE AND ALBUTEROL SULFATE 3 ML: .5; 3 SOLUTION RESPIRATORY (INHALATION) at 09:13

## 2019-10-10 RX ADMIN — SERTRALINE HYDROCHLORIDE 150 MG: 50 TABLET ORAL at 21:51

## 2019-10-10 RX ADMIN — INSULIN LISPRO 15 UNITS: 100 INJECTION, SOLUTION INTRAVENOUS; SUBCUTANEOUS at 18:20

## 2019-10-10 RX ADMIN — GUAIFENESIN 400 MG: 100 SOLUTION ORAL at 08:34

## 2019-10-10 RX ADMIN — HEPARIN SODIUM 5000 UNITS: 5000 INJECTION INTRAVENOUS; SUBCUTANEOUS at 06:44

## 2019-10-10 RX ADMIN — MONTELUKAST 10 MG: 10 TABLET, FILM COATED ORAL at 21:52

## 2019-10-10 RX ADMIN — SERTRALINE HYDROCHLORIDE 100 MG: 50 TABLET ORAL at 08:35

## 2019-10-10 NOTE — PROGRESS NOTES
Respiratory Therapy Assessment Care Plan    Patient:  Gloria Rojo 64 y.o. female 10/10/2019 10:52 AM    COPD exacerbation ([facility] [J44.1]  Asthma exacerbation [J45.901]      Chest X-RAY:   Results from Hospital Encounter encounter on 10/08/19   XR CHEST PA LAT    Impression IMPRESSION:    No significant interval change or acute pulmonary process identified. Results from [place] encounter on 05/31/16   XR KNEE RT MAX 2 VWS    Impression IMPRESSION:    Right total knee arthroplasty in good anatomic alignment. Results from [place] encounter on 05/26/13   XR ABD FLAT/ ERECT    Impression IMPRESSION: No acute findings in the chest or abdomen                  Vital Signs:   Visit Vitals  /70   Pulse 77   Temp 98.4 °F (36.9 °C)   Resp 22   Ht 5' 7\" (1.702 m)   Wt 113.4 kg (250 lb)   SpO2 98%   Breastfeeding?  No   BMI 39.16 kg/m²           Indications for treatment: Asthma/COPD exacerbation      Plan of care: Duoneb Q4H, O2 as needed        Goal: Improve gas exchange, oxygenation

## 2019-10-10 NOTE — PHYSICIAN ADVISORY
Letter of Admission Status Determination: Upgrade to Inpatient Marshall Ruiz was hospitalized as observation status on 10/8/2019. Her hospital stay has already crossed 2 medically necessary midnights; ongoing hospitalization was warranted for this patient with COPD exacerbation and persistent symptoms requiring IV steroids, further management and close monitoring. Since Marshall Ruiz required medically necessary hospital care spanning at least two midnights, she has met the benchmark for Inpatient Admission status. Based on the documented clinical condition and care plan, we recommend upgrading patient's hospitalization status to INPATIENT. The final decision regarding the patient's hospitalization status depends on the attending physician's clinical judgment. Hyacinth Goode MD, KATHLEEN, [facility], CHCQM-PHYADV Physician Advisor [address] [place] [address],[facility] Floor [phone]

## 2019-10-10 NOTE — PROGRESS NOTES
Problem: Discharge Planning  Goal: *Discharge to safe environment  Outcome: Progressing Towards Goal     Home / [facility] COPD

## 2019-10-10 NOTE — DIABETES MGMT
Glycemic Control Plan of Care    Admitted overnight for respiratory failure and COPD exacerbation - receiving IV steroids. Reviewed her elevated BG values and current A1c -     Your A1C  was   Lab Results   Component Value Date/Time    Hemoglobin A1c 7.4 (H) 10/08/2019 03:44 PM   .        States she only takes metformin 1,000 mg BID at home. She is no longer taking amaryl. Monitors her BG daily and results are between  - follows DM diet. She is aware of her elevated BG with steroids - reviewed our hospital insulin protocol and current insulin doses. instructed to monitor BG more frequently when discharged and f/u with her PCP.       Martine Groves MPH RN  Pager [phone]  Office [phone]

## 2019-10-10 NOTE — PROGRESS NOTES
Problem: Diabetes Self-Management  Goal: *Disease process and treatment process  Description  Define diabetes and identify own type of diabetes; list 3 options for treating diabetes. Outcome: Progressing Towards Goal  Goal: *Incorporating nutritional management into lifestyle  Description  Describe effect of type, amount and timing of food on blood glucose; list 3 methods for planning meals. Outcome: Progressing Towards Goal  Goal: *Incorporating physical activity into lifestyle  Description  State effect of exercise on blood glucose levels. Outcome: Progressing Towards Goal  Goal: *Developing strategies to promote health/change behavior  Description  Define the ABC's of diabetes; identify appropriate screenings, schedule and personal plan for screenings. Outcome: Progressing Towards Goal  Goal: *Using medications safely  Description  State effect of diabetes medications on diabetes; name diabetes medication taking, action and side effects. Outcome: Progressing Towards Goal  Goal: *Monitoring blood glucose, interpreting and using results  Description  Identify recommended blood glucose targets  and personal targets. Outcome: Progressing Towards Goal  Goal: *Prevention, detection, treatment of acute complications  Description  List symptoms of hyper- and hypoglycemia; describe how to treat low blood sugar and actions for lowering  high blood glucose level. Outcome: Progressing Towards Goal  Goal: *Prevention, detection and treatment of chronic complications  Description  Define the natural course of diabetes and describe the relationship of blood glucose levels to long term complications of diabetes.   Outcome: Progressing Towards Goal  Goal: *Developing strategies to address psychosocial issues  Description  Describe feelings about living with diabetes; identify support needed and support network  Outcome: Progressing Towards Goal  Goal: *Sick day guidelines  Outcome: Progressing Towards Goal  Goal: *Patient Specific Goal (EDIT GOAL, INSERT TEXT)  Outcome: Progressing Towards Goal     Problem: Patient Education: Go to Patient Education Activity  Goal: Patient/Family Education  Outcome: Progressing Towards Goal     Problem: Pain  Goal: *Control of Pain  Outcome: Progressing Towards Goal     Problem: Patient Education: Go to Patient Education Activity  Goal: Patient/Family Education  Outcome: Progressing Towards Goal     Problem: Chronic Obstructive Pulmonary Disease (COPD)  Goal: *Oxygen saturation during activity within specified parameters  Outcome: Progressing Towards Goal  Goal: *Able to remain out of bed as prescribed  Outcome: Progressing Towards Goal  Goal: *Absence of hypoxia  Outcome: Progressing Towards Goal  Goal: *Optimize nutritional status  Outcome: Progressing Towards Goal     Problem: Patient Education: Go to Patient Education Activity  Goal: Patient/Family Education  Outcome: Progressing Towards Goal     Problem: Falls - Risk of  Goal: *Absence of Falls  Description  Document Jimmy Strong Fall Risk and appropriate interventions in the flowsheet.   Outcome: Progressing Towards Goal  Note:   Fall Risk Interventions:            Medication Interventions: Evaluate medications/consider consulting pharmacy, Patient to call before getting OOB, Teach patient to arise slowly                   Problem: Patient Education: Go to Patient Education Activity  Goal: Patient/Family Education  Outcome: Progressing Towards Goal     Problem: Discharge Planning  Goal: *Discharge to safe environment  Outcome: Progressing Towards Goal

## 2019-10-10 NOTE — PROGRESS NOTES
0 - [facility] Pt in bed, alert and oriented x 4. Not in any form of distress. Denies pain. Frequent use items and call bell within reach. Verbalized understanding to call for assistance. Bed locked in lowest position. Patient requested to have something to help her with constipation. Notified Dr. Sandra Hooker that patient used Magnesium Citrate in the past and works well with her. Order received to administer Magnesium Citrate once PO.       0720 - Bedside and Verbal shift change report given to [place]ino [place] (oncoming nurse) by Beck Landeros RN (offgoing nurse). Report included the following information SBAR, Kardex, Intake/Output, MAR and Recent Results.

## 2019-10-10 NOTE — PROGRESS NOTES
NUTRITION INITIAL ASSESSMENT/PLAN OF CARE      RECOMMENDATIONS:   1. Consistent CHO Diet  2. Monitor labs, weight and PO intake  3. RD to follow     GOALS:   1. PO intake meets >75% of protein/calorie needs by 10/17  2. Weight Maintenance/gradual loss ( 1-2 lb/week) by 10/17      ASSESSMENT:   Wt status is classified as obese per Body mass index is 39.16 kg/m². Adequate PO intake per vitals (100%). Labs noted. BG range (297-447) over the past 24 hours; A1c (7.4%). Glycemic control team following. Pt w/ hyponatremia, hypophosphatemia, hypermagnesemia, elevated WBC (19.4) and H/H (8.3/28.0). Nutrition recommendations listed. RD to follow. Nutrition Diagnoses: Altered nutrition related lab values related to medication induced hyperglycemia as evidenced by BG range (297-447) over the past 24 hours d/t patient receiving Solu-Medrol. Obesity related to predicted excessive energy intake PTA as evidenced by a BMI of 39.1 and 185% IBW    Nutrition Risk:  [] High  [] Moderate [x]  Low    SUBJECTIVE/OBJECTIVE:    Pt admitted for COPD and asthma exacerbation. MST received for possible recent weight loss. Pt seen in room at lunch. Pt is allergic to shellfish and no problems chewing/swallowing. Stated weight has fluctuated this past year currently with weight gain, but she knows she is heavier than what she should be right now. Reports having a good appetite and consuming 3 meals per day at home. Pt stated she follows a DM diet at home and A1c (7.4%) taken this admission. Explained that with BG levels being elevated d/t medications it is that much more important to implement diet restrictions to avoid food/beverages contributing anymore than necessary. Glycemic control following and adjusting medications as needed. Will continue to monitor.      Information Obtained from:    [x] Chart Review   [x] Patient   [] Family/Caregiver   [] Nurse/Physician   [] Interdisciplinary Meeting/Rounds      Diet: Cardiac Consistent CHO Diet  Medications: [x] Reviewed  Norvasc, Lantus, Humalog, Solu-Medrol    Allergies: [x] Reviewed   Encounter Diagnoses     ICD-10-CM ICD-9-CM   1. Severe persistent asthma with acute exacerbation J45.51 493.92   2.  Bronchitis J40 490     Past Medical History:   Diagnosis Date    Arthritis     Asthma     Chronic pain     Bilateral knees    COPD     [facility] mellitus ([facility]     GERD (gastroesophageal reflux disease)     HTN (hypertension)     Morbid obesity ([facility]     Osteoarthritis of right knee 5/30/2016    Psychiatric disorder     Major depression, PTSD, and anxiety      Labs:    Lab Results   Component Value Date/Time    Sodium 135 (L) 10/10/2019 04:05 AM    Potassium 5.2 10/10/2019 04:05 AM    Chloride 102 10/10/2019 04:05 AM    CO2 27 10/10/2019 04:05 AM    Anion gap 6 10/10/2019 04:05 AM    Glucose 313 (H) 10/10/2019 04:05 AM    BUN 18 10/10/2019 04:05 AM    Creatinine 0.81 10/10/2019 04:05 AM    Calcium 8.9 10/10/2019 04:05 AM    Magnesium 2.8 (H) 10/10/2019 04:05 AM    Phosphorus 1.8 (L) 10/10/2019 04:05 AM    Albumin 3.4 10/08/2019 03:44 PM       Anthropometrics: BMI (calculated): 39.1  Last 3 Recorded Weights in this Encounter    10/08/19 1459   Weight: 113.4 kg (250 lb)      Ht Readings from Last 1 Encounters:   10/08/19 5' 7\" (1.702 m)       Documented Weight History:  Weight Metrics 10/8/2019 5/31/2016 5/10/2016 6/2/2014 5/26/2013 5/24/2013 5/16/2013   Weight 250 lb 241 lb 250 lb 260 lb 262 lb 262 lb 262 lb   BMI 39.16 kg/m2 38.92 kg/m2 40.37 kg/m2 41.99 kg/m2 42.31 kg/m2 42.31 kg/m2 42.31 kg/m2       Patient Vitals for the past 100 hrs:   % Diet Eaten   10/09/19 0959 100 %         IBW: 135 lb %IBW: 185% UBW: 235 lb   [] Weight Loss [x] Weight Gain [] Weight Stable    Estimated Nutrition Needs: [x] MSJ x 1.2  [x] Other: 17 kcal/kg  Calories: 6221-1074 kcal Based on:   [x] Actual BW    Protein:    g Based on:   [x] Actual BW    Fluid:       0085-2042 ml Based on:   [x] Actual BW [x] No Cultural, Gnosticism or ethnic dietary need identified.     [] Cultural, Gnosticism and ethnic food preferences identified and addressed     Wt Status:  [] Normal (18.6 - 24.9) [] Underweight (<18.5) [] Overweight (25 - 29.9) [] Mild Obesity (30 - 34.9)  [x] Moderate Obesity (35 - 39.9) [] Morbid Obesity (40+)       Nutrition Problems Identified:   [] Suboptimal PO intake   [x] Food Allergies (Shellfish)  [] Difficulty chewing/swallowing/poor dentition  [] Constipation/Diarrhea   [] Nausea/Vomiting   [x] None  [] Other:     Plan:   [x] Therapeutic Diet  [x]  Obtained/adjusted food preferences/tolerances and/or snacks options   []  Supplements added   [] Occupational therapy following for feeding techniques  []  HS snack added   []  Modify diet texture   [x]  Modify diet for food allergies   []  Educate patient   []  Assist with menu selection   [x]  Monitor PO intake on meal rounds   [x]  Continue inpatient monitoring and intervention   [x]  Participated in discharge planning/Interdisciplinary rounds/Team meetings   []  Other:     Education Needs:   [] Not appropriate for teaching at this time due to:   [x] Identified and addressed    Nutrition Monitoring and Evaluation:  [x] Continue ongoing monitoring and intervention  [] Bea Bateman

## 2019-10-10 NOTE — PROGRESS NOTES
Progress Note      Patient: Savanna Rahman               Sex: female          DOA: 10/8/2019       YOB: 1960      Age:  64 y.o.        LOS:  LOS: 0 days             CHIEF COMPLAINT:  Cough and Lethargy      Assessment/Plan:     Principal Problem:    Acute respiratory failure with hypoxemia ([facility] (10/8/2019)    Active Problems:    Obesity (11/15/2012)      COPD exacerbation ([facility] (10/8/2019)      DM (diabetes mellitus) ([facility] (10/8/2019)      Asthma exacerbation (10/10/2019)        PLAN:  COPD exacerbation  Cont steroids, nebulized bronchodilators, antibiotics  Start chest PT  Check sputum culture and urine antigens, culture pending  Start robitussin with codeine d/t severe frequent cough  Check RVP    Diabetes mellitus, type 2  Hyperglycemia due to steroids  Increase lantus  Cont sliding scale insulin with glycemic control protocol      DVT ppx    Subjective:     Pt states she continues to feels worse, coughing persistently, still wheezing. C/o abd pain from coughing    Objective:     Visit Vitals  /70   Pulse 77   Temp 98.4 °F (36.9 °C)   Resp 22   Ht 5' 7\" (1.702 m)   Wt 113.4 kg (250 lb)   SpO2 98%   Breastfeeding? No   BMI 39.16 kg/m²        Physical Exam:  Ears: hearing is intact  Eyes: Icterus is not present  Lungs: diffuse wheezes, coughing  Heart: regular rate and rhythm, S1, S2 normal, no murmur, click, rub or gallop  Gastrointestinal: soft, non-tender.  Bowel sounds normal. No masses,  no organomegaly  Neurological:  New Focal Deficits are not present  Psychiatric:  Mood is stable    Lab/Data Reviewed:  CMP:   Lab Results   Component Value Date/Time     (L) 10/10/2019 04:05 AM    K 5.2 10/10/2019 04:05 AM     10/10/2019 04:05 AM    CO2 27 10/10/2019 04:05 AM    AGAP 6 10/10/2019 04:05 AM     (H) 10/10/2019 04:05 AM    BUN 18 10/10/2019 04:05 AM    CREA 0.81 10/10/2019 04:05 AM    GFRAA >60 10/10/2019 04:05 AM    GFRNA >60 10/10/2019 04:05 AM    CA 8.9 10/10/2019 04:05 AM    MG 2.8 (H) 10/10/2019 04:05 AM    PHOS 1.8 (L) 10/10/2019 04:05 AM     CBC:   Lab Results   Component Value Date/Time    WBC 19.4 (H) 10/10/2019 04:05 AM    HGB 8.3 (L) 10/10/2019 04:05 AM    HCT 28.0 (L) 10/10/2019 04:05 AM     10/10/2019 04:05 AM     All Cardiac Markers in the last 24 hours:   No results found for: CPK, CK, CKMMB, CKMB, RCK3, CKMBT, CKNDX, CKND1, HUBER, TROPT, TROIQ, KAL, TROPT, TNIPOC, BNP, BNPP  Recent Glucose Results:   Lab Results   Component Value Date/Time     (H) 10/10/2019 04:05 AM       Imaging Reviewed:  No results found.

## 2019-10-10 NOTE — PROGRESS NOTES
Received bedside report from Isaiah Khan RN. Pt Aox4, Non-tele, NC-4liters, skin intact, pt resting in bed/bed in low position, wheels locked. 1900-Bedside and Verbal shift change report given to Juliane Frederick RN (oncoming nurse) by [place] RN (offgoing nurse). Report included the following information SBAR, Kardex, Intake/Output and Cardiac Rhythm Non-tele.

## 2019-10-11 ENCOUNTER — APPOINTMENT (OUTPATIENT)
Dept: CT IMAGING | Age: 59
DRG: 190 | End: 2019-10-11
Attending: HOSPITALIST
Payer: MEDICARE

## 2019-10-11 LAB
ANION GAP SERPL CALC-SCNC: 7 MMOL/L (ref 3–18)
BASOPHILS # BLD: 0 K/UL (ref 0–0.1)
BASOPHILS NFR BLD: 0 % (ref 0–2)
BUN SERPL-MCNC: 23 MG/DL (ref 7–18)
BUN/CREAT SERPL: 23 (ref 12–20)
CALCIUM SERPL-MCNC: 8.4 MG/DL (ref 8.5–10.1)
CHLORIDE SERPL-SCNC: 99 MMOL/L (ref 100–111)
CO2 SERPL-SCNC: 27 MMOL/L (ref 21–32)
CREAT SERPL-MCNC: 1.02 MG/DL (ref 0.6–1.3)
DIFFERENTIAL METHOD BLD: ABNORMAL
EOSINOPHIL # BLD: 0 K/UL (ref 0–0.4)
EOSINOPHIL NFR BLD: 0 % (ref 0–5)
ERYTHROCYTE [DISTWIDTH] IN BLOOD BY AUTOMATED COUNT: 18 % (ref 11.6–14.5)
GLUCOSE BLD STRIP.AUTO-MCNC: 199 MG/DL (ref 70–110)
GLUCOSE BLD STRIP.AUTO-MCNC: 408 MG/DL (ref 70–110)
GLUCOSE BLD STRIP.AUTO-MCNC: 472 MG/DL (ref 70–110)
GLUCOSE BLD STRIP.AUTO-MCNC: 492 MG/DL (ref 70–110)
GLUCOSE BLD STRIP.AUTO-MCNC: 524 MG/DL (ref 70–110)
GLUCOSE BLD STRIP.AUTO-MCNC: 533 MG/DL (ref 70–110)
GLUCOSE BLD STRIP.AUTO-MCNC: 545 MG/DL (ref 70–110)
GLUCOSE SERPL-MCNC: 490 MG/DL (ref 74–99)
HCT VFR BLD AUTO: 28.1 % (ref 35–45)
HGB BLD-MCNC: 8.3 G/DL (ref 12–16)
LYMPHOCYTES # BLD: 2.1 K/UL (ref 0.9–3.6)
LYMPHOCYTES NFR BLD: 12 % (ref 21–52)
MAGNESIUM SERPL-MCNC: 2.8 MG/DL (ref 1.6–2.6)
MCH RBC QN AUTO: 21.6 PG (ref 24–34)
MCHC RBC AUTO-ENTMCNC: 29.5 G/DL (ref 31–37)
MCV RBC AUTO: 73 FL (ref 74–97)
MONOCYTES # BLD: 0.8 K/UL (ref 0.05–1.2)
MONOCYTES NFR BLD: 5 % (ref 3–10)
NEUTS SEG # BLD: 14 K/UL (ref 1.8–8)
NEUTS SEG NFR BLD: 83 % (ref 40–73)
PHOSPHATE SERPL-MCNC: 2.6 MG/DL (ref 2.5–4.9)
PLATELET # BLD AUTO: 376 K/UL (ref 135–420)
PMV BLD AUTO: 9.8 FL (ref 9.2–11.8)
POTASSIUM SERPL-SCNC: 4.9 MMOL/L (ref 3.5–5.5)
RBC # BLD AUTO: 3.85 M/UL (ref 4.2–5.3)
SODIUM SERPL-SCNC: 133 MMOL/L (ref 136–145)
WBC # BLD AUTO: 16.9 K/UL (ref 4.6–13.2)

## 2019-10-11 PROCEDURE — 74011250636 HC RX REV CODE- 250/636: Performed by: HOSPITALIST

## 2019-10-11 PROCEDURE — 82962 GLUCOSE BLOOD TEST: CPT

## 2019-10-11 PROCEDURE — 94760 N-INVAS EAR/PLS OXIMETRY 1: CPT

## 2019-10-11 PROCEDURE — 94640 AIRWAY INHALATION TREATMENT: CPT

## 2019-10-11 PROCEDURE — 74011250637 HC RX REV CODE- 250/637: Performed by: HOSPITALIST

## 2019-10-11 PROCEDURE — 74011000258 HC RX REV CODE- 258: Performed by: HOSPITALIST

## 2019-10-11 PROCEDURE — 71275 CT ANGIOGRAPHY CHEST: CPT

## 2019-10-11 PROCEDURE — 74011000250 HC RX REV CODE- 250: Performed by: INTERNAL MEDICINE

## 2019-10-11 PROCEDURE — 74011000250 HC RX REV CODE- 250: Performed by: HOSPITALIST

## 2019-10-11 PROCEDURE — 74011250636 HC RX REV CODE- 250/636: Performed by: INTERNAL MEDICINE

## 2019-10-11 PROCEDURE — 77010033678 HC OXYGEN DAILY

## 2019-10-11 PROCEDURE — 65270000029 HC RM PRIVATE

## 2019-10-11 PROCEDURE — 85025 COMPLETE CBC W/AUTO DIFF WBC: CPT

## 2019-10-11 PROCEDURE — 74011000258 HC RX REV CODE- 258: Performed by: INTERNAL MEDICINE

## 2019-10-11 PROCEDURE — 74011250637 HC RX REV CODE- 250/637: Performed by: INTERNAL MEDICINE

## 2019-10-11 PROCEDURE — 83735 ASSAY OF MAGNESIUM: CPT

## 2019-10-11 PROCEDURE — 74011636320 HC RX REV CODE- 636/320: Performed by: HOSPITALIST

## 2019-10-11 PROCEDURE — 84100 ASSAY OF PHOSPHORUS: CPT

## 2019-10-11 PROCEDURE — 74011636637 HC RX REV CODE- 636/637: Performed by: HOSPITALIST

## 2019-10-11 PROCEDURE — 80048 BASIC METABOLIC PNL TOTAL CA: CPT

## 2019-10-11 PROCEDURE — 36415 COLL VENOUS BLD VENIPUNCTURE: CPT

## 2019-10-11 PROCEDURE — 94761 N-INVAS EAR/PLS OXIMETRY MLT: CPT

## 2019-10-11 RX ORDER — OXYCODONE HYDROCHLORIDE 5 MG/1
10 TABLET ORAL
Status: DISCONTINUED | OUTPATIENT
Start: 2019-10-11 | End: 2019-10-17 | Stop reason: HOSPADM

## 2019-10-11 RX ORDER — ARFORMOTEROL TARTRATE 15 UG/2ML
15 SOLUTION RESPIRATORY (INHALATION)
Status: DISCONTINUED | OUTPATIENT
Start: 2019-10-11 | End: 2019-10-17 | Stop reason: HOSPADM

## 2019-10-11 RX ORDER — HYDROCODONE POLISTIREX AND CHLORPHENIRAMINE POLISTIREX 10; 8 MG/5ML; MG/5ML
5 SUSPENSION, EXTENDED RELEASE ORAL
Status: DISCONTINUED | OUTPATIENT
Start: 2019-10-11 | End: 2019-10-17 | Stop reason: HOSPADM

## 2019-10-11 RX ORDER — PANTOPRAZOLE SODIUM 20 MG/1
20 TABLET, DELAYED RELEASE ORAL
Status: DISCONTINUED | OUTPATIENT
Start: 2019-10-11 | End: 2019-10-17 | Stop reason: HOSPADM

## 2019-10-11 RX ORDER — INSULIN LISPRO 100 [IU]/ML
10 INJECTION, SOLUTION INTRAVENOUS; SUBCUTANEOUS
Status: DISCONTINUED | OUTPATIENT
Start: 2019-10-11 | End: 2019-10-13

## 2019-10-11 RX ORDER — SODIUM CHLORIDE 9 MG/ML
100 INJECTION, SOLUTION INTRAVENOUS ONCE
Status: COMPLETED | OUTPATIENT
Start: 2019-10-11 | End: 2019-10-11

## 2019-10-11 RX ORDER — GUAIFENESIN 600 MG/1
1200 TABLET, EXTENDED RELEASE ORAL EVERY 12 HOURS
Status: DISCONTINUED | OUTPATIENT
Start: 2019-10-11 | End: 2019-10-17 | Stop reason: HOSPADM

## 2019-10-11 RX ORDER — IPRATROPIUM BROMIDE AND ALBUTEROL SULFATE 2.5; .5 MG/3ML; MG/3ML
3 SOLUTION RESPIRATORY (INHALATION)
Status: DISCONTINUED | OUTPATIENT
Start: 2019-10-11 | End: 2019-10-17 | Stop reason: HOSPADM

## 2019-10-11 RX ORDER — INSULIN GLARGINE 100 [IU]/ML
28 INJECTION, SOLUTION SUBCUTANEOUS DAILY
Status: DISCONTINUED | OUTPATIENT
Start: 2019-10-12 | End: 2019-10-13

## 2019-10-11 RX ADMIN — GUAIFENESIN 400 MG: 100 SOLUTION ORAL at 17:55

## 2019-10-11 RX ADMIN — INSULIN LISPRO 15 UNITS: 100 INJECTION, SOLUTION INTRAVENOUS; SUBCUTANEOUS at 17:58

## 2019-10-11 RX ADMIN — INSULIN LISPRO 15 UNITS: 100 INJECTION, SOLUTION INTRAVENOUS; SUBCUTANEOUS at 08:48

## 2019-10-11 RX ADMIN — INSULIN LISPRO 10 UNITS: 100 INJECTION, SOLUTION INTRAVENOUS; SUBCUTANEOUS at 20:31

## 2019-10-11 RX ADMIN — GUAIFENESIN 400 MG: 100 SOLUTION ORAL at 05:50

## 2019-10-11 RX ADMIN — GUAIFENESIN 400 MG: 100 SOLUTION ORAL at 02:46

## 2019-10-11 RX ADMIN — IPRATROPIUM BROMIDE AND ALBUTEROL SULFATE 3 ML: .5; 3 SOLUTION RESPIRATORY (INHALATION) at 03:18

## 2019-10-11 RX ADMIN — METHYLPREDNISOLONE SODIUM SUCCINATE 60 MG: 125 INJECTION, POWDER, FOR SOLUTION INTRAMUSCULAR; INTRAVENOUS at 08:49

## 2019-10-11 RX ADMIN — CEFEPIME HYDROCHLORIDE 1 G: 1 INJECTION, POWDER, FOR SOLUTION INTRAMUSCULAR; INTRAVENOUS at 20:30

## 2019-10-11 RX ADMIN — GUAIFENESIN AND CODEINE PHOSPHATE 10 ML: 100; 10 SOLUTION ORAL at 00:29

## 2019-10-11 RX ADMIN — OXYCODONE HYDROCHLORIDE 10 MG: 5 TABLET ORAL at 17:57

## 2019-10-11 RX ADMIN — GUAIFENESIN 400 MG: 100 SOLUTION ORAL at 13:22

## 2019-10-11 RX ADMIN — METHYLPREDNISOLONE SODIUM SUCCINATE 60 MG: 125 INJECTION, POWDER, FOR SOLUTION INTRAMUSCULAR; INTRAVENOUS at 23:53

## 2019-10-11 RX ADMIN — IPRATROPIUM BROMIDE AND ALBUTEROL SULFATE 3 ML: .5; 3 SOLUTION RESPIRATORY (INHALATION) at 13:38

## 2019-10-11 RX ADMIN — CETIRIZINE HYDROCHLORIDE 10 MG: 10 TABLET, FILM COATED ORAL at 08:47

## 2019-10-11 RX ADMIN — GABAPENTIN 600 MG: 300 CAPSULE ORAL at 17:56

## 2019-10-11 RX ADMIN — INSULIN HUMAN 10 UNITS: 100 INJECTION, SOLUTION PARENTERAL at 17:59

## 2019-10-11 RX ADMIN — GUAIFENESIN 1200 MG: 600 TABLET, EXTENDED RELEASE ORAL at 20:31

## 2019-10-11 RX ADMIN — CLONAZEPAM 1 MG: 0.5 TABLET ORAL at 10:53

## 2019-10-11 RX ADMIN — HEPARIN SODIUM 5000 UNITS: 5000 INJECTION INTRAVENOUS; SUBCUTANEOUS at 06:14

## 2019-10-11 RX ADMIN — HEPARIN SODIUM 5000 UNITS: 5000 INJECTION INTRAVENOUS; SUBCUTANEOUS at 13:23

## 2019-10-11 RX ADMIN — INSULIN LISPRO 15 UNITS: 100 INJECTION, SOLUTION INTRAVENOUS; SUBCUTANEOUS at 22:32

## 2019-10-11 RX ADMIN — METHYLPREDNISOLONE SODIUM SUCCINATE 60 MG: 125 INJECTION, POWDER, FOR SOLUTION INTRAMUSCULAR; INTRAVENOUS at 17:56

## 2019-10-11 RX ADMIN — MONTELUKAST 10 MG: 10 TABLET, FILM COATED ORAL at 22:28

## 2019-10-11 RX ADMIN — IPRATROPIUM BROMIDE AND ALBUTEROL SULFATE 3 ML: .5; 3 SOLUTION RESPIRATORY (INHALATION) at 17:23

## 2019-10-11 RX ADMIN — HYDROCODONE POLISTIREX AND CHLORPHENIRAMINE POLISTIREX 5 ML: 10; 8 SUSPENSION, EXTENDED RELEASE ORAL at 22:28

## 2019-10-11 RX ADMIN — OXYCODONE HYDROCHLORIDE 10 MG: 5 TABLET ORAL at 12:00

## 2019-10-11 RX ADMIN — GABAPENTIN 600 MG: 300 CAPSULE ORAL at 22:29

## 2019-10-11 RX ADMIN — OXYCODONE HYDROCHLORIDE AND ACETAMINOPHEN 1 TABLET: 10; 325 TABLET ORAL at 06:21

## 2019-10-11 RX ADMIN — IPRATROPIUM BROMIDE AND ALBUTEROL SULFATE 3 ML: .5; 3 SOLUTION RESPIRATORY (INHALATION) at 19:55

## 2019-10-11 RX ADMIN — INSULIN GLARGINE 20 UNITS: 100 INJECTION, SOLUTION SUBCUTANEOUS at 08:48

## 2019-10-11 RX ADMIN — HEPARIN SODIUM 5000 UNITS: 5000 INJECTION INTRAVENOUS; SUBCUTANEOUS at 22:31

## 2019-10-11 RX ADMIN — PANTOPRAZOLE SODIUM 20 MG: 20 TABLET, DELAYED RELEASE ORAL at 22:29

## 2019-10-11 RX ADMIN — AMLODIPINE BESYLATE 10 MG: 5 TABLET ORAL at 08:47

## 2019-10-11 RX ADMIN — ARFORMOTEROL TARTRATE 15 MCG: 15 SOLUTION RESPIRATORY (INHALATION) at 19:55

## 2019-10-11 RX ADMIN — SERTRALINE HYDROCHLORIDE 150 MG: 50 TABLET ORAL at 22:31

## 2019-10-11 RX ADMIN — INSULIN LISPRO 3 UNITS: 100 INJECTION, SOLUTION INTRAVENOUS; SUBCUTANEOUS at 13:23

## 2019-10-11 RX ADMIN — INSULIN HUMAN 10 UNITS: 100 INJECTION, SOLUTION PARENTERAL at 10:53

## 2019-10-11 RX ADMIN — SODIUM CHLORIDE 80 ML: 900 INJECTION, SOLUTION INTRAVENOUS at 12:19

## 2019-10-11 RX ADMIN — PANTOPRAZOLE SODIUM 40 MG: 40 TABLET, DELAYED RELEASE ORAL at 06:20

## 2019-10-11 RX ADMIN — GUAIFENESIN 400 MG: 100 SOLUTION ORAL at 08:48

## 2019-10-11 RX ADMIN — DOXYCYCLINE 100 MG: 100 CAPSULE ORAL at 08:48

## 2019-10-11 RX ADMIN — SERTRALINE HYDROCHLORIDE 100 MG: 50 TABLET ORAL at 08:50

## 2019-10-11 RX ADMIN — OXYCODONE HYDROCHLORIDE 10 MG: 5 TABLET ORAL at 23:53

## 2019-10-11 RX ADMIN — IOPAMIDOL 90 ML: 755 INJECTION, SOLUTION INTRAVENOUS at 12:18

## 2019-10-11 RX ADMIN — QUETIAPINE FUMARATE 300 MG: 300 TABLET ORAL at 23:53

## 2019-10-11 RX ADMIN — GABAPENTIN 600 MG: 300 CAPSULE ORAL at 08:48

## 2019-10-11 RX ADMIN — GUAIFENESIN AND CODEINE PHOSPHATE 10 ML: 100; 10 SOLUTION ORAL at 04:43

## 2019-10-11 RX ADMIN — IPRATROPIUM BROMIDE AND ALBUTEROL SULFATE 3 ML: .5; 3 SOLUTION RESPIRATORY (INHALATION) at 08:24

## 2019-10-11 NOTE — PROGRESS NOTES
Problem: Diabetes Self-Management  Goal: *Disease process and treatment process  Description  Define diabetes and identify own type of diabetes; list 3 options for treating diabetes. Outcome: Progressing Towards Goal  Goal: *Incorporating nutritional management into lifestyle  Description  Describe effect of type, amount and timing of food on blood glucose; list 3 methods for planning meals. Outcome: Progressing Towards Goal  Goal: *Incorporating physical activity into lifestyle  Description  State effect of exercise on blood glucose levels. Outcome: Progressing Towards Goal  Goal: *Developing strategies to promote health/change behavior  Description  Define the ABC's of diabetes; identify appropriate screenings, schedule and personal plan for screenings. Outcome: Progressing Towards Goal  Goal: *Using medications safely  Description  State effect of diabetes medications on diabetes; name diabetes medication taking, action and side effects. Outcome: Progressing Towards Goal  Goal: *Monitoring blood glucose, interpreting and using results  Description  Identify recommended blood glucose targets  and personal targets. Outcome: Progressing Towards Goal  Goal: *Prevention, detection, treatment of acute complications  Description  List symptoms of hyper- and hypoglycemia; describe how to treat low blood sugar and actions for lowering  high blood glucose level. Outcome: Progressing Towards Goal  Goal: *Prevention, detection and treatment of chronic complications  Description  Define the natural course of diabetes and describe the relationship of blood glucose levels to long term complications of diabetes.   Outcome: Progressing Towards Goal  Goal: *Developing strategies to address psychosocial issues  Description  Describe feelings about living with diabetes; identify support needed and support network  Outcome: Progressing Towards Goal  Goal: *Sick day guidelines  Outcome: Progressing Towards Goal  Goal: *Patient Specific Goal (EDIT GOAL, INSERT TEXT)  Outcome: Progressing Towards Goal     Problem: Patient Education: Go to Patient Education Activity  Goal: Patient/Family Education  Outcome: Progressing Towards Goal     Problem: Pain  Goal: *Control of Pain  Outcome: Progressing Towards Goal     Problem: Patient Education: Go to Patient Education Activity  Goal: Patient/Family Education  Outcome: Progressing Towards Goal     Problem: Chronic Obstructive Pulmonary Disease (COPD)  Goal: *Oxygen saturation during activity within specified parameters  Outcome: Progressing Towards Goal  Goal: *Able to remain out of bed as prescribed  Outcome: Progressing Towards Goal  Goal: *Absence of hypoxia  Outcome: Progressing Towards Goal  Goal: *Optimize nutritional status  Outcome: Progressing Towards Goal     Problem: Patient Education: Go to Patient Education Activity  Goal: Patient/Family Education  Outcome: Progressing Towards Goal     Problem: Falls - Risk of  Goal: *Absence of Falls  Description  Document Maurilio Diaz Fall Risk and appropriate interventions in the flowsheet.   Outcome: Progressing Towards Goal  Note:   Fall Risk Interventions:            Medication Interventions: Evaluate medications/consider consulting pharmacy, Teach patient to arise slowly    Elimination Interventions: Call light in reach, Patient to call for help with toileting needs              Problem: Patient Education: Go to Patient Education Activity  Goal: Patient/Family Education  Outcome: Progressing Towards Goal     Problem: Discharge Planning  Goal: *Discharge to safe environment  Outcome: Progressing Towards Goal     Problem: Diabetes Maintenance:Ongoing  Goal: *Blood Glucose 80 to 180 md/dl  Description  Insulin management with steroids    Outcome: Progressing Towards Goal     Problem: Altered nutrition  Goal: *Optimize nutritional status  Outcome: Progressing Towards Goal

## 2019-10-11 NOTE — DIABETES MGMT
Glycemic Control:  Recommend increasing Lantus to 28 units q 24 hrs  for 11 BG >300.  Javier LOZADA  Recent Glucose Results:   Lab Results   Component Value Date/Time     () 10/11/2019 04:20 AM    GLUCPOC 408 () 10/11/2019 10:34 AM    GLUCPOC 472 () 10/11/2019 08:02 AM    GLUCPOC 568 () 10/10/2019 10:22 PM

## 2019-10-11 NOTE — PROGRESS NOTES
Problem: Discharge Planning  Goal: *Discharge to safe environment  Outcome: Progressing Towards Goal     Home / [facility] COPD

## 2019-10-11 NOTE — PROGRESS NOTES
Problem: Chronic Obstructive Pulmonary Disease (COPD)  Goal: *Oxygen saturation during activity within specified parameters  Outcome: Progressing Towards Goal   Respiratory Therapy Assessment Care Plan    Patient:  Cisco Alvarado 64 y.o. female 10/11/2019 8:28 AM    COPD exacerbation ([facility] [address] [J44.1]  Asthma exacerbation [J45.901]      Chest X-RAY:   Results from Hospital Encounter encounter on 10/08/19   XR CHEST PA LAT    Impression IMPRESSION:    No significant interval change or acute pulmonary process identified. Results from [place] encounter on 05/31/16   XR KNEE RT MAX 2 VWS    Impression IMPRESSION:    Right total knee arthroplasty in good anatomic alignment. Results from [place] encounter on 05/26/13   XR ABD FLAT/ ERECT    Impression IMPRESSION: No acute findings in the chest or abdomen                  Vital Signs:   Visit Vitals  /72   Pulse 75   Temp 98.2 °F (36.8 °C)   Resp 20   Ht 5' 7\" (1.702 m)   Wt 113.4 kg (250 lb)   SpO2 98%   Breastfeeding? No   BMI 39.16 kg/m²           Indications for treatment: Asthma/COPD exacerbation        Plan of care: Duoneb Q4H, O2 as needed           Goal: Improve breating.

## 2019-10-11 NOTE — PROGRESS NOTES
Progress Note      Patient: Kathy Hardy               Sex: female          DOA: 10/8/2019       YOB: 1960      Age:  64 y.o.        LOS:  LOS: 1 day             CHIEF COMPLAINT:  Cough and Lethargy      Assessment/Plan:     Principal Problem:    Acute respiratory failure with hypoxemia ([facility] (10/8/2019)    Active Problems:    Obesity (11/15/2012)      COPD exacerbation ([facility] (10/8/2019)      DM (diabetes mellitus) ([facility] (10/8/2019)      Asthma exacerbation (10/10/2019)        PLAN:  COPD exacerbation  Cont steroids, nebulized bronchodilators, antibiotics  Start chest PT  Check sputum culture and urine antigens, culture pending, shows few GNR  Start robitussin with codeine d/t severe frequent cough  Check RVP  CTA chest today done, which is neg for PE but does show tracheomalacia and alveolitis. Will consult Pulm. Diabetes mellitus, type 2  Hyperglycemia due to steroids  Increase lantus  Cont sliding scale insulin with glycemic control protocol      DVT ppx    Subjective:     Pt states she continues to feels worse, coughing persistently, still wheezing. C/o abd pain from coughing. Dyspneic with minimal exertion to the bathroom. Objective:     Visit Vitals  /64   Pulse 79   Temp 98.1 °F (36.7 °C)   Resp 20   Ht 5' 7\" (1.702 m)   Wt 113.4 kg (250 lb)   SpO2 98%   Breastfeeding? No   BMI 39.16 kg/m²        Physical Exam:  Ears: hearing is intact  Eyes: Icterus is not present  Lungs: diffuse wheezes, persistent coughing  Heart: regular rate and rhythm, S1, S2 normal, no murmur, click, rub or gallop  Gastrointestinal: soft, non-tender.  Bowel sounds normal. No masses,  no organomegaly  Neurological:  New Focal Deficits are not present  Psychiatric:  Mood is stable    Lab/Data Reviewed:  CMP:   Lab Results   Component Value Date/Time     (L) 10/11/2019 04:20 AM    K 4.9 10/11/2019 04:20 AM    CL 99 (L) 10/11/2019 04:20 AM    CO2 27 10/11/2019 04:20 AM    AGAP 7 10/11/2019 04:20 AM  (HH) 10/11/2019 04:20 AM    BUN 23 (H) 10/11/2019 04:20 AM    CREA 1.02 10/11/2019 04:20 AM    GFRAA >60 10/11/2019 04:20 AM    GFRNA 56 (L) 10/11/2019 04:20 AM    CA 8.4 (L) 10/11/2019 04:20 AM    MG 2.8 (H) 10/11/2019 04:20 AM    PHOS 2.6 10/11/2019 04:20 AM     CBC:   Lab Results   Component Value Date/Time    WBC 16.9 (H) 10/11/2019 04:20 AM    HGB 8.3 (L) 10/11/2019 04:20 AM    HCT 28.1 (L) 10/11/2019 04:20 AM     10/11/2019 04:20 AM     All Cardiac Markers in the last 24 hours:   No results found for: CPK, CK, CKMMB, CKMB, RCK3, CKMBT, CKNDX, CKND1, HUBER, TROPT, TROIQ, KAL, TROPT, TNIPOC, BNP, BNPP  Recent Glucose Results:   Lab Results   Component Value Date/Time     ([place]) 10/11/2019 04:20 AM       Imaging Reviewed:  Cta Chest W Or W Wo Cont    Result Date: 10/11/2019  EXAM: CTA chest INDICATION: Chest pain. Short of breath COMPARISON: Two-view chest 10/8/2019 TECHNIQUE: Axial CT imaging from the thoracic inlet through the diaphragm with intravenous contrast. Coronal and sagittal MIP reformats were generated. One or more dose reduction techniques were used on this CT: automated exposure control, adjustment of the mAs and/or kVp according to patient size, and iterative reconstruction techniques. The specific techniques used on this CT exam have been documented in the patient's electronic medical record. Digital Imaging and Communications in Medicine (DICOM) format image data are available to nonaffiliated external healthcare facilities or entities on a secure, media free, reciprocally searchable basis with patient authorization for at least a 12-month period after this study. _______________ FINDINGS: EXAM QUALITY: Overall exam quality is diagnostic. PULMONARY ARTERIES: No evidence of pulmonary embolism. MEDIASTINUM: Heart is mildly enlarged. No pericardial effusion. Mild atherosclerotic changes of aorta. Smaller coronary artery calcification. Esophagus is unremarkable.  LUNGS: Small areas of groundglass density in the right apical region and anterior upper lobe regions bilaterally. Less confluent areas of groundglass density are present in the inferior left lower lobe. Nodular density along the left major fissure is consistent with fissural lymph node. PLEURA: Normal. AIRWAY: There is moderate narrowing of the distal trachea just above the domenica with significant narrowing of the mainstem bronchi centrally. LYMPH NODES: Borderline enlarged superior mediastinal lymph node measures 11 mm short axis. Prevascular lymph node measures 7 mm short axis. UPPER ABDOMEN: Unremarkable. OTHER: No acute or aggressive osseous abnormalities identified. _______________     IMPRESSION: 1. No evidence of pulmonary embolism. 2.  Findings suggesting tracheomalacia involving the distal trachea and proximal mainstem bronchi bilaterally. 3. Nonspecific areas of groundglass density in the upper lobes consistent with alveolitis which may be related to central airway disease described in 2 above. Expiratory CT chest without contrast could be performed to confirm presence of tracheomalacia.

## 2019-10-11 NOTE — CONSULTS
Pulmonary consultation    History  56-year-old woman with a history of asthma and tobacco abuse. She was admitted to the hospital on 10/10/2019 with a 14-day history of cough, shortness of breath, fatigue and fevers. Fevers were measured as high as 103. She did not have sputum production. There was no hemoptysis. Her abdomen is very sore from the severity and frequency of the cough. Outpatient therapy with Levaquin, Mucinex and oral steroids was unsuccessful. Her cough seems to have two different levels. The first is a long-term intermittent paroxysmal cough. The second is this acute cough that appears to have accompanied a lower respiratory tract infection. This second cough has been associated with much greater shortness of breath. She was using her nebulizer 8-10 times a day before coming into the hospital.  At baseline she can get up a flight of stairs but needs to catch her breath at the top. She can grocery shop so long as she leans on a cart. She typically does not waken at night short of breath. She does have an abnormal sleep study but was not started on CPAP for reasons that are unclear. Currently she is short of breath with any activity. Review of systems  No neurologic deficits. No change in sinus congestion. No palpable adenopathy. Currently her chest pain seems to be related to coughing, so exertional chest pain suggestive of cardiac disease is difficult to tease out. No nausea or vomiting. No change in urination. No lower extremity edema.   Review of systems was completed in its entirety and is otherwise normal    Past medical history  Obesity  Hypertension  Diabetes mellitus  Gastroesophageal reflux disease  Obstructive sleep apnea  Osteoarthritis of bilateral knees with chronic pain  Depression with anxiety  PTSD  Asthma with COPD    Past surgical history   section  Construction    Allergies   Allergen Reactions    Latex Hives and Itching    Pollens Extract Sneezing    Demerol [Meperidine] Hives and Itching    Penicillins Rash    Shellfish Derived Itching    Soap Itching     Jergen's, Brunvioletta Darheathalam    Tessalon [Benzonatate] Swelling   ACE inhibitor with cough    Social history  0.25 to 0.5 pack/day for 38 years    Family history  Hypertension, heart failure, migraine headaches and vision problems in her parents    Exam  She is alert, oriented and in at least mild respiratory distress at rest.  Her affect is normal.  She had a forceful, wheezy and dry cough throughout the interview. Blood pressure 133/76, pulse 86, temperature 97.9 °F (36.6 °C), resp. rate 20, height 5' 7\" (1.702 m), weight 113.4 kg (250 lb), SpO2 97 %, not currently breastfeeding. Gaze is conjugate. Extraocular muscles appear intact. Oral mucosa is moist  Neck is supple and there is no adenopathy  Breath sounds are wheezy throughout. Heart tones are very difficult to hear but are regular. Abdomen is tender, but this is related to movement and muscle pain rather than peritoneal sign. Lower extremities show no edema. No cyanosis or clubbing  No gross motor deficit    ABG 2 L nasal cannula: 7.37/39/82    Radiographs  Tracheobronchomalacia, groundglass infiltrates and emphysema. The groundglass infiltrates are apical and overall relatively minimal.      Labs 10/11/2019  WBC 16.9 on steroids, hemoglobin 8.3 and platelets 747. Sodium 133, potassium 4.9, bicarb 27 and anion gap 7. BUN 23 and creatinine 1.02. Glucose 490 likely reflecting steroid effect.   Hemoglobin A1c on 7.4    EKG shows a normal sinus rhythm with a slight prolongation of the QTC at 488    Assessment  Cough  Dyspnea  Tracheobronchomalacia  Morbid obesity  Obstructive sleep apnea  Anemia of unclear etiology  Asthma/COPD  Tobacco abuse  Diabetes mellitus  Gastroesophageal reflux disease    Plan  Split Protonix to twice daily  Repeat PSG  Add LABA  Regular use of acapella  Cefepime for 5 days  Tussionex  Consider changing guaifenesin to 1200 mg bid tablet  Consider esophogram and gastric emptying studies

## 2019-10-11 NOTE — PROGRESS NOTES
7236: Bedside shift change report given to Agustín CHARLES (oncoming nurse) by Taylor Manning RN (offgoing nurse). Report included the following information SBAR, Kardex, Procedure Summary, Intake/Output and MAR. Patient in bed eating, call light in reach. 0848: Assessment performed medication administered. Patient had requested for Klonopin due to anixety, but wanted to clean self up. I stated I would bring medication around after patient cleaned themselves up.    0945: paged MD about patient , order for IV insulin and recheck one hour after administration. 1053: BG checked 408, Regular Insulin administered and patient still stated feeling anxious, Klonopin given. 1150: Recheck patient . Patient had complaints of pain, Medication administered. 1200: Patient off floor for procedure. 1323: Medication administered. Patient in room eating lunch. Family at bedside. 1445: Hourly rounding: patient in room 5 P's addressed. Family at bedside. 1515: Educated patient that a minor cannot stay over night without the accompaniment of an adult that is not the patient. 1630: Patient in bed, family present. 1758: Reassessment done. Medications administered. 1855: Educated patient on outside food having more likely chance to increase blood glucose. New IV inserted and old one taken out. 1930: Bedside shift change report given to [address] [place] [address] Avenue  (oncoming nurse) by Bar Hernandez RN  (offgoing nurse). Report included the following information SBAR, Kardex, Procedure Summary, Intake/Output and MAR. Rechecked Insulin post Regular insulin given. , night shift Rn aware.

## 2019-10-12 LAB
ANION GAP SERPL CALC-SCNC: 4 MMOL/L (ref 3–18)
BACTERIA SPEC CULT: ABNORMAL
BACTERIA SPEC CULT: ABNORMAL
BASOPHILS # BLD: 0 K/UL (ref 0–0.1)
BASOPHILS NFR BLD: 0 % (ref 0–2)
BUN SERPL-MCNC: 22 MG/DL (ref 7–18)
BUN/CREAT SERPL: 25 (ref 12–20)
CALCIUM SERPL-MCNC: 8.2 MG/DL (ref 8.5–10.1)
CHLORIDE SERPL-SCNC: 101 MMOL/L (ref 100–111)
CO2 SERPL-SCNC: 29 MMOL/L (ref 21–32)
CREAT SERPL-MCNC: 0.88 MG/DL (ref 0.6–1.3)
DIFFERENTIAL METHOD BLD: ABNORMAL
EOSINOPHIL # BLD: 0 K/UL (ref 0–0.4)
EOSINOPHIL NFR BLD: 0 % (ref 0–5)
ERYTHROCYTE [DISTWIDTH] IN BLOOD BY AUTOMATED COUNT: 18.3 % (ref 11.6–14.5)
GLUCOSE BLD STRIP.AUTO-MCNC: 340 MG/DL (ref 70–110)
GLUCOSE BLD STRIP.AUTO-MCNC: 398 MG/DL (ref 70–110)
GLUCOSE BLD STRIP.AUTO-MCNC: 410 MG/DL (ref 70–110)
GLUCOSE BLD STRIP.AUTO-MCNC: 467 MG/DL (ref 70–110)
GLUCOSE SERPL-MCNC: 358 MG/DL (ref 74–99)
GRAM STN SPEC: ABNORMAL
HCT VFR BLD AUTO: 29.3 % (ref 35–45)
HGB BLD-MCNC: 8.8 G/DL (ref 12–16)
LYMPHOCYTES # BLD: 3 K/UL (ref 0.9–3.6)
LYMPHOCYTES NFR BLD: 19 % (ref 21–52)
MAGNESIUM SERPL-MCNC: 2.7 MG/DL (ref 1.6–2.6)
MCH RBC QN AUTO: 22.1 PG (ref 24–34)
MCHC RBC AUTO-ENTMCNC: 30 G/DL (ref 31–37)
MCV RBC AUTO: 73.6 FL (ref 74–97)
MONOCYTES # BLD: 1.1 K/UL (ref 0.05–1.2)
MONOCYTES NFR BLD: 7 % (ref 3–10)
NEUTS SEG # BLD: 11.5 K/UL (ref 1.8–8)
NEUTS SEG NFR BLD: 74 % (ref 40–73)
PHOSPHATE SERPL-MCNC: 3.1 MG/DL (ref 2.5–4.9)
PLATELET # BLD AUTO: 349 K/UL (ref 135–420)
PMV BLD AUTO: 9.7 FL (ref 9.2–11.8)
POTASSIUM SERPL-SCNC: 4.8 MMOL/L (ref 3.5–5.5)
RBC # BLD AUTO: 3.98 M/UL (ref 4.2–5.3)
SERVICE CMNT-IMP: ABNORMAL
SODIUM SERPL-SCNC: 134 MMOL/L (ref 136–145)
WBC # BLD AUTO: 15.7 K/UL (ref 4.6–13.2)

## 2019-10-12 PROCEDURE — 94640 AIRWAY INHALATION TREATMENT: CPT

## 2019-10-12 PROCEDURE — 74011000258 HC RX REV CODE- 258: Performed by: INTERNAL MEDICINE

## 2019-10-12 PROCEDURE — 74011250636 HC RX REV CODE- 250/636: Performed by: HOSPITALIST

## 2019-10-12 PROCEDURE — 94761 N-INVAS EAR/PLS OXIMETRY MLT: CPT

## 2019-10-12 PROCEDURE — 74011250636 HC RX REV CODE- 250/636: Performed by: INTERNAL MEDICINE

## 2019-10-12 PROCEDURE — 80048 BASIC METABOLIC PNL TOTAL CA: CPT

## 2019-10-12 PROCEDURE — 83735 ASSAY OF MAGNESIUM: CPT

## 2019-10-12 PROCEDURE — 82962 GLUCOSE BLOOD TEST: CPT

## 2019-10-12 PROCEDURE — 94668 MNPJ CHEST WALL SBSQ: CPT

## 2019-10-12 PROCEDURE — 74011250637 HC RX REV CODE- 250/637: Performed by: HOSPITALIST

## 2019-10-12 PROCEDURE — 74011000250 HC RX REV CODE- 250: Performed by: INTERNAL MEDICINE

## 2019-10-12 PROCEDURE — 77010033678 HC OXYGEN DAILY

## 2019-10-12 PROCEDURE — 85025 COMPLETE CBC W/AUTO DIFF WBC: CPT

## 2019-10-12 PROCEDURE — 74011250637 HC RX REV CODE- 250/637: Performed by: INTERNAL MEDICINE

## 2019-10-12 PROCEDURE — 74011636637 HC RX REV CODE- 636/637: Performed by: HOSPITALIST

## 2019-10-12 PROCEDURE — 36415 COLL VENOUS BLD VENIPUNCTURE: CPT

## 2019-10-12 PROCEDURE — 84100 ASSAY OF PHOSPHORUS: CPT

## 2019-10-12 PROCEDURE — 65270000029 HC RM PRIVATE

## 2019-10-12 RX ADMIN — HYDROCODONE POLISTIREX AND CHLORPHENIRAMINE POLISTIREX 5 ML: 10; 8 SUSPENSION, EXTENDED RELEASE ORAL at 15:29

## 2019-10-12 RX ADMIN — ARFORMOTEROL TARTRATE 15 MCG: 15 SOLUTION RESPIRATORY (INHALATION) at 08:55

## 2019-10-12 RX ADMIN — INSULIN LISPRO 12 UNITS: 100 INJECTION, SOLUTION INTRAVENOUS; SUBCUTANEOUS at 17:40

## 2019-10-12 RX ADMIN — GABAPENTIN 600 MG: 300 CAPSULE ORAL at 21:47

## 2019-10-12 RX ADMIN — HYDROXYZINE HYDROCHLORIDE 25 MG: 25 TABLET, FILM COATED ORAL at 15:21

## 2019-10-12 RX ADMIN — CETIRIZINE HYDROCHLORIDE 10 MG: 10 TABLET, FILM COATED ORAL at 08:18

## 2019-10-12 RX ADMIN — HEPARIN SODIUM 5000 UNITS: 5000 INJECTION INTRAVENOUS; SUBCUTANEOUS at 13:09

## 2019-10-12 RX ADMIN — METHYLPREDNISOLONE SODIUM SUCCINATE 60 MG: 125 INJECTION, POWDER, FOR SOLUTION INTRAMUSCULAR; INTRAVENOUS at 15:22

## 2019-10-12 RX ADMIN — CLONAZEPAM 1 MG: 0.5 TABLET ORAL at 15:22

## 2019-10-12 RX ADMIN — QUETIAPINE FUMARATE 300 MG: 300 TABLET ORAL at 21:46

## 2019-10-12 RX ADMIN — IPRATROPIUM BROMIDE AND ALBUTEROL SULFATE 3 ML: .5; 3 SOLUTION RESPIRATORY (INHALATION) at 20:36

## 2019-10-12 RX ADMIN — AMLODIPINE BESYLATE 10 MG: 5 TABLET ORAL at 08:18

## 2019-10-12 RX ADMIN — GUAIFENESIN 1200 MG: 600 TABLET, EXTENDED RELEASE ORAL at 08:18

## 2019-10-12 RX ADMIN — HEPARIN SODIUM 5000 UNITS: 5000 INJECTION INTRAVENOUS; SUBCUTANEOUS at 21:45

## 2019-10-12 RX ADMIN — IPRATROPIUM BROMIDE AND ALBUTEROL SULFATE 3 ML: .5; 3 SOLUTION RESPIRATORY (INHALATION) at 08:55

## 2019-10-12 RX ADMIN — INSULIN LISPRO 15 UNITS: 100 INJECTION, SOLUTION INTRAVENOUS; SUBCUTANEOUS at 08:19

## 2019-10-12 RX ADMIN — INSULIN GLARGINE 28 UNITS: 100 INJECTION, SOLUTION SUBCUTANEOUS at 08:18

## 2019-10-12 RX ADMIN — OXYCODONE HYDROCHLORIDE 10 MG: 5 TABLET ORAL at 13:09

## 2019-10-12 RX ADMIN — HEPARIN SODIUM 5000 UNITS: 5000 INJECTION INTRAVENOUS; SUBCUTANEOUS at 06:44

## 2019-10-12 RX ADMIN — INSULIN LISPRO 10 UNITS: 100 INJECTION, SOLUTION INTRAVENOUS; SUBCUTANEOUS at 17:39

## 2019-10-12 RX ADMIN — IPRATROPIUM BROMIDE AND ALBUTEROL SULFATE 3 ML: .5; 3 SOLUTION RESPIRATORY (INHALATION) at 12:10

## 2019-10-12 RX ADMIN — GABAPENTIN 600 MG: 300 CAPSULE ORAL at 08:18

## 2019-10-12 RX ADMIN — ARFORMOTEROL TARTRATE 15 MCG: 15 SOLUTION RESPIRATORY (INHALATION) at 20:36

## 2019-10-12 RX ADMIN — CEFEPIME HYDROCHLORIDE 1 G: 1 INJECTION, POWDER, FOR SOLUTION INTRAMUSCULAR; INTRAVENOUS at 18:16

## 2019-10-12 RX ADMIN — OXYCODONE HYDROCHLORIDE 10 MG: 5 TABLET ORAL at 06:42

## 2019-10-12 RX ADMIN — INSULIN LISPRO 10 UNITS: 100 INJECTION, SOLUTION INTRAVENOUS; SUBCUTANEOUS at 12:08

## 2019-10-12 RX ADMIN — INSULIN LISPRO 15 UNITS: 100 INJECTION, SOLUTION INTRAVENOUS; SUBCUTANEOUS at 22:29

## 2019-10-12 RX ADMIN — GUAIFENESIN 1200 MG: 600 TABLET, EXTENDED RELEASE ORAL at 20:46

## 2019-10-12 RX ADMIN — OXYCODONE HYDROCHLORIDE 10 MG: 5 TABLET ORAL at 19:13

## 2019-10-12 RX ADMIN — INSULIN LISPRO 10 UNITS: 100 INJECTION, SOLUTION INTRAVENOUS; SUBCUTANEOUS at 08:19

## 2019-10-12 RX ADMIN — INSULIN LISPRO 15 UNITS: 100 INJECTION, SOLUTION INTRAVENOUS; SUBCUTANEOUS at 12:07

## 2019-10-12 RX ADMIN — PANTOPRAZOLE SODIUM 20 MG: 20 TABLET, DELAYED RELEASE ORAL at 21:46

## 2019-10-12 RX ADMIN — GABAPENTIN 600 MG: 300 CAPSULE ORAL at 15:21

## 2019-10-12 RX ADMIN — SERTRALINE HYDROCHLORIDE 150 MG: 50 TABLET ORAL at 21:47

## 2019-10-12 RX ADMIN — PANTOPRAZOLE SODIUM 20 MG: 20 TABLET, DELAYED RELEASE ORAL at 06:42

## 2019-10-12 RX ADMIN — METHYLPREDNISOLONE SODIUM SUCCINATE 60 MG: 125 INJECTION, POWDER, FOR SOLUTION INTRAMUSCULAR; INTRAVENOUS at 08:19

## 2019-10-12 RX ADMIN — CEFEPIME HYDROCHLORIDE 1 G: 1 INJECTION, POWDER, FOR SOLUTION INTRAMUSCULAR; INTRAVENOUS at 06:44

## 2019-10-12 RX ADMIN — MONTELUKAST 10 MG: 10 TABLET, FILM COATED ORAL at 21:46

## 2019-10-12 RX ADMIN — CLONAZEPAM 1 MG: 0.5 TABLET ORAL at 20:45

## 2019-10-12 RX ADMIN — SERTRALINE HYDROCHLORIDE 100 MG: 50 TABLET ORAL at 08:18

## 2019-10-12 RX ADMIN — IPRATROPIUM BROMIDE AND ALBUTEROL SULFATE 3 ML: .5; 3 SOLUTION RESPIRATORY (INHALATION) at 16:24

## 2019-10-12 RX ADMIN — HYDROXYZINE HYDROCHLORIDE 25 MG: 25 TABLET, FILM COATED ORAL at 21:48

## 2019-10-12 NOTE — PROGRESS NOTES
0800-Received pt resting in bed with eyes closed, easily awakened, no sign of distress, pt states, \"Feels so good now that I can breathe again\" \"Feels so good to not be wheezing\"   0930-No sign of distress, eating breakfast, tolerating well  1300-Sitting up in bed, eating lunch, tolerating well  1330-No sign of distress, visitors at bedside  1930-Bedside and Verbal shift change report given to [address] [place] [address] (oncoming nurse) by Adam Robles RN (offgoing nurse). Report included the following information SBAR, Kardex, Intake/Output, MAR and Recent Results.

## 2019-10-12 NOTE — PROGRESS NOTES
1938: Assumed patient care. Received patient resting in bed, patient is alert and oriented x 4. No signs of distress. Stated her pain is tolerable at this time. On 3 Lpm via NC. Call bell within reach. 10-12-19    0745: Bedside and Verbal shift change report given to [facility] (oncoming nurse) by Inocencia Weems   (offgoing nurse). Report included the following information SBAR, Intake/Output, MAR and Recent Results.

## 2019-10-12 NOTE — PROGRESS NOTES
Progress Note      Patient: Antwan Forte               Sex: female          DOA: 10/8/2019       YOB: 1960      Age:  64 y.o.        LOS:  LOS: 2 days             CHIEF COMPLAINT:  Cough and Lethargy      Assessment/Plan:     Principal Problem:    Acute respiratory failure with hypoxemia ([facility] (10/8/2019)    Active Problems:    Obesity (11/15/2012)      COPD exacerbation ([facility] (10/8/2019)      DM (diabetes mellitus) ([facility] (10/8/2019)      Asthma exacerbation (10/10/2019)        PLAN:  COPD exacerbation  Cont steroids, nebulized bronchodilators, antibiotics. Switch atbx to cefepime per Pulm; day 2/5,  Start chest PT  Check sputum culture and urine antigens, culture pending, shows few GNR  Start robitussin with codeine d/t severe frequent cough  Check RVP  CTA chest is neg for PE but does show tracheomalacia and alveolitis. Consulted Pulm, appreciate recs      Diabetes mellitus, type 2  Hyperglycemia due to steroids  Increase lantus  Schedule novolog  Cont sliding scale insulin with glycemic control protocol      DVT ppx    Subjective:     Pt states she finally is starting to feel better. Wheezing and cough improved today. Objective:     Visit Vitals  /65   Pulse 70   Temp 97.6 °F (36.4 °C)   Resp 20   Ht 5' 7\" (1.702 m)   Wt 113.4 kg (250 lb)   SpO2 97%   Breastfeeding? No   BMI 39.16 kg/m²        Physical Exam:  Ears: hearing is intact  Eyes: Icterus is not present  Lungs: wheezes bilaterally, improved  Heart: regular rate and rhythm, S1, S2 normal, no murmur, click, rub or gallop  Gastrointestinal: soft, non-tender.  Bowel sounds normal. No masses,  no organomegaly  Neurological:  New Focal Deficits are not present  Psychiatric:  Mood is stable    Lab/Data Reviewed:  CMP:   Lab Results   Component Value Date/Time     (L) 10/12/2019 04:45 AM    K 4.8 10/12/2019 04:45 AM     10/12/2019 04:45 AM    CO2 29 10/12/2019 04:45 AM    AGAP 4 10/12/2019 04:45 AM     (H) 10/12/2019 04:45 AM    BUN 22 (H) 10/12/2019 04:45 AM    CREA 0.88 10/12/2019 04:45 AM    GFRAA >60 10/12/2019 04:45 AM    GFRNA >60 10/12/2019 04:45 AM    CA 8.2 (L) 10/12/2019 04:45 AM    MG 2.7 (H) 10/12/2019 04:45 AM    PHOS 3.1 10/12/2019 04:45 AM     CBC:   Lab Results   Component Value Date/Time    WBC 15.7 (H) 10/12/2019 04:45 AM    HGB 8.8 (L) 10/12/2019 04:45 AM    HCT 29.3 (L) 10/12/2019 04:45 AM     10/12/2019 04:45 AM     All Cardiac Markers in the last 24 hours:   No results found for: CPK, CK, CKMMB, CKMB, RCK3, CKMBT, CKNDX, CKND1, HUBER, TROPT, TROIQ, KAL, TROPT, TNIPOC, BNP, BNPP  Recent Glucose Results:   Lab Results   Component Value Date/Time     (H) 10/12/2019 04:45 AM       Imaging Reviewed:  No results found.

## 2019-10-12 NOTE — PROGRESS NOTES
Problem: Chronic Obstructive Pulmonary Disease (COPD)  Goal: *Absence of hypoxia  Outcome: Progressing Towards Goal   Respiratory Therapy Assessment Care Plan    Patient:  Gloria Rojo 64 y.o. female 10/12/2019 4:39 PM    COPD exacerbation ([facility] [address] [J44.1]  Asthma exacerbation [J45.901]      Chest X-RAY:   Results from Hospital Encounter encounter on 10/08/19   XR CHEST PA LAT    Impression IMPRESSION:    No significant interval change or acute pulmonary process identified. Results from [place] encounter on 05/31/16   XR KNEE RT MAX 2 VWS    Impression IMPRESSION:    Right total knee arthroplasty in good anatomic alignment. Results from [place] encounter on 05/26/13   XR ABD FLAT/ ERECT    Impression IMPRESSION: No acute findings in the chest or abdomen                  Vital Signs:   Visit Vitals  /80   Pulse 86   Temp 97.6 °F (36.4 °C)   Resp 20   Ht 5' 7\" (1.702 m)   Wt 113.4 kg (250 lb)   SpO2 94%   Breastfeeding?  No   BMI 39.16 kg/m²           Indications for treatment: Copd      Plan of care: Duoneb Q4, [facility] Oxygen Therapy        Goal:Decrease work of breathing, Titrate Fio2

## 2019-10-13 LAB
ANION GAP SERPL CALC-SCNC: 8 MMOL/L (ref 3–18)
BASOPHILS # BLD: 0 K/UL (ref 0–0.1)
BASOPHILS NFR BLD: 0 % (ref 0–2)
BUN SERPL-MCNC: 22 MG/DL (ref 7–18)
BUN/CREAT SERPL: 28 (ref 12–20)
CALCIUM SERPL-MCNC: 8.7 MG/DL (ref 8.5–10.1)
CHLORIDE SERPL-SCNC: 99 MMOL/L (ref 100–111)
CO2 SERPL-SCNC: 29 MMOL/L (ref 21–32)
CREAT SERPL-MCNC: 0.79 MG/DL (ref 0.6–1.3)
DIFFERENTIAL METHOD BLD: ABNORMAL
EOSINOPHIL # BLD: 0 K/UL (ref 0–0.4)
EOSINOPHIL NFR BLD: 0 % (ref 0–5)
ERYTHROCYTE [DISTWIDTH] IN BLOOD BY AUTOMATED COUNT: 17.8 % (ref 11.6–14.5)
GLUCOSE BLD STRIP.AUTO-MCNC: 295 MG/DL (ref 70–110)
GLUCOSE BLD STRIP.AUTO-MCNC: 334 MG/DL (ref 70–110)
GLUCOSE BLD STRIP.AUTO-MCNC: 472 MG/DL (ref 70–110)
GLUCOSE BLD STRIP.AUTO-MCNC: 488 MG/DL (ref 70–110)
GLUCOSE BLD STRIP.AUTO-MCNC: 505 MG/DL (ref 70–110)
GLUCOSE BLD STRIP.AUTO-MCNC: 521 MG/DL (ref 70–110)
GLUCOSE SERPL-MCNC: 194 MG/DL (ref 74–99)
HCT VFR BLD AUTO: 28.7 % (ref 35–45)
HGB BLD-MCNC: 8.6 G/DL (ref 12–16)
LYMPHOCYTES # BLD: 4.7 K/UL (ref 0.9–3.6)
LYMPHOCYTES NFR BLD: 22 % (ref 21–52)
MAGNESIUM SERPL-MCNC: 2.4 MG/DL (ref 1.6–2.6)
MCH RBC QN AUTO: 21.8 PG (ref 24–34)
MCHC RBC AUTO-ENTMCNC: 30 G/DL (ref 31–37)
MCV RBC AUTO: 72.7 FL (ref 74–97)
MONOCYTES # BLD: 1.6 K/UL (ref 0.05–1.2)
MONOCYTES NFR BLD: 8 % (ref 3–10)
NEUTS SEG # BLD: 15.5 K/UL (ref 1.8–8)
NEUTS SEG NFR BLD: 70 % (ref 40–73)
PHOSPHATE SERPL-MCNC: 3.8 MG/DL (ref 2.5–4.9)
PLATELET # BLD AUTO: 361 K/UL (ref 135–420)
PMV BLD AUTO: 9.6 FL (ref 9.2–11.8)
POTASSIUM SERPL-SCNC: 4.8 MMOL/L (ref 3.5–5.5)
RBC # BLD AUTO: 3.95 M/UL (ref 4.2–5.3)
SODIUM SERPL-SCNC: 136 MMOL/L (ref 136–145)
WBC # BLD AUTO: 21.9 K/UL (ref 4.6–13.2)

## 2019-10-13 PROCEDURE — 94668 MNPJ CHEST WALL SBSQ: CPT

## 2019-10-13 PROCEDURE — 74011250636 HC RX REV CODE- 250/636: Performed by: HOSPITALIST

## 2019-10-13 PROCEDURE — 77010033678 HC OXYGEN DAILY

## 2019-10-13 PROCEDURE — 74011250636 HC RX REV CODE- 250/636: Performed by: INTERNAL MEDICINE

## 2019-10-13 PROCEDURE — 74011636637 HC RX REV CODE- 636/637: Performed by: HOSPITALIST

## 2019-10-13 PROCEDURE — 65270000029 HC RM PRIVATE

## 2019-10-13 PROCEDURE — 83735 ASSAY OF MAGNESIUM: CPT

## 2019-10-13 PROCEDURE — 94640 AIRWAY INHALATION TREATMENT: CPT

## 2019-10-13 PROCEDURE — 74011000258 HC RX REV CODE- 258: Performed by: INTERNAL MEDICINE

## 2019-10-13 PROCEDURE — 85025 COMPLETE CBC W/AUTO DIFF WBC: CPT

## 2019-10-13 PROCEDURE — 74011000250 HC RX REV CODE- 250: Performed by: INTERNAL MEDICINE

## 2019-10-13 PROCEDURE — 74011250637 HC RX REV CODE- 250/637: Performed by: HOSPITALIST

## 2019-10-13 PROCEDURE — 94761 N-INVAS EAR/PLS OXIMETRY MLT: CPT

## 2019-10-13 PROCEDURE — 36415 COLL VENOUS BLD VENIPUNCTURE: CPT

## 2019-10-13 PROCEDURE — 84100 ASSAY OF PHOSPHORUS: CPT

## 2019-10-13 PROCEDURE — 74011250637 HC RX REV CODE- 250/637: Performed by: INTERNAL MEDICINE

## 2019-10-13 PROCEDURE — 74011636637 HC RX REV CODE- 636/637: Performed by: FAMILY MEDICINE

## 2019-10-13 PROCEDURE — 80048 BASIC METABOLIC PNL TOTAL CA: CPT

## 2019-10-13 RX ORDER — INSULIN LISPRO 100 [IU]/ML
INJECTION, SOLUTION INTRAVENOUS; SUBCUTANEOUS ONCE
Status: COMPLETED | OUTPATIENT
Start: 2019-10-13 | End: 2019-10-13

## 2019-10-13 RX ORDER — INSULIN LISPRO 100 [IU]/ML
12 INJECTION, SOLUTION INTRAVENOUS; SUBCUTANEOUS
Status: DISCONTINUED | OUTPATIENT
Start: 2019-10-14 | End: 2019-10-14

## 2019-10-13 RX ORDER — INSULIN GLARGINE 100 [IU]/ML
4 INJECTION, SOLUTION SUBCUTANEOUS ONCE
Status: COMPLETED | OUTPATIENT
Start: 2019-10-13 | End: 2019-10-13

## 2019-10-13 RX ORDER — INSULIN GLARGINE 100 [IU]/ML
34 INJECTION, SOLUTION SUBCUTANEOUS DAILY
Status: DISCONTINUED | OUTPATIENT
Start: 2019-10-14 | End: 2019-10-14

## 2019-10-13 RX ADMIN — METHYLPREDNISOLONE SODIUM SUCCINATE 60 MG: 125 INJECTION, POWDER, FOR SOLUTION INTRAMUSCULAR; INTRAVENOUS at 00:00

## 2019-10-13 RX ADMIN — OXYCODONE HYDROCHLORIDE 10 MG: 5 TABLET ORAL at 18:54

## 2019-10-13 RX ADMIN — CLONAZEPAM 1 MG: 0.5 TABLET ORAL at 10:29

## 2019-10-13 RX ADMIN — CYCLOBENZAPRINE HYDROCHLORIDE 10 MG: 10 TABLET, FILM COATED ORAL at 17:54

## 2019-10-13 RX ADMIN — GUAIFENESIN 1200 MG: 600 TABLET, EXTENDED RELEASE ORAL at 21:32

## 2019-10-13 RX ADMIN — INSULIN LISPRO 10 UNITS: 100 INJECTION, SOLUTION INTRAVENOUS; SUBCUTANEOUS at 09:30

## 2019-10-13 RX ADMIN — HYDROCODONE POLISTIREX AND CHLORPHENIRAMINE POLISTIREX 5 ML: 10; 8 SUSPENSION, EXTENDED RELEASE ORAL at 15:05

## 2019-10-13 RX ADMIN — INSULIN LISPRO 15 UNITS: 100 INJECTION, SOLUTION INTRAVENOUS; SUBCUTANEOUS at 21:41

## 2019-10-13 RX ADMIN — HEPARIN SODIUM 5000 UNITS: 5000 INJECTION INTRAVENOUS; SUBCUTANEOUS at 06:02

## 2019-10-13 RX ADMIN — ARFORMOTEROL TARTRATE 15 MCG: 15 SOLUTION RESPIRATORY (INHALATION) at 09:47

## 2019-10-13 RX ADMIN — INSULIN GLARGINE 4 UNITS: 100 INJECTION, SOLUTION SUBCUTANEOUS at 12:14

## 2019-10-13 RX ADMIN — GABAPENTIN 600 MG: 300 CAPSULE ORAL at 10:07

## 2019-10-13 RX ADMIN — INSULIN LISPRO 10 UNITS: 100 INJECTION, SOLUTION INTRAVENOUS; SUBCUTANEOUS at 12:16

## 2019-10-13 RX ADMIN — CETIRIZINE HYDROCHLORIDE 10 MG: 10 TABLET, FILM COATED ORAL at 10:07

## 2019-10-13 RX ADMIN — INSULIN LISPRO 15 UNITS: 100 INJECTION, SOLUTION INTRAVENOUS; SUBCUTANEOUS at 17:59

## 2019-10-13 RX ADMIN — IPRATROPIUM BROMIDE AND ALBUTEROL SULFATE 3 ML: .5; 3 SOLUTION RESPIRATORY (INHALATION) at 12:40

## 2019-10-13 RX ADMIN — METHYLPREDNISOLONE SODIUM SUCCINATE 60 MG: 125 INJECTION, POWDER, FOR SOLUTION INTRAMUSCULAR; INTRAVENOUS at 10:14

## 2019-10-13 RX ADMIN — CYCLOBENZAPRINE HYDROCHLORIDE 10 MG: 10 TABLET, FILM COATED ORAL at 01:15

## 2019-10-13 RX ADMIN — IPRATROPIUM BROMIDE AND ALBUTEROL SULFATE 3 ML: .5; 3 SOLUTION RESPIRATORY (INHALATION) at 16:09

## 2019-10-13 RX ADMIN — INSULIN LISPRO 9 UNITS: 100 INJECTION, SOLUTION INTRAVENOUS; SUBCUTANEOUS at 09:30

## 2019-10-13 RX ADMIN — GABAPENTIN 600 MG: 300 CAPSULE ORAL at 17:54

## 2019-10-13 RX ADMIN — INSULIN LISPRO 12 UNITS: 100 INJECTION, SOLUTION INTRAVENOUS; SUBCUTANEOUS at 12:17

## 2019-10-13 RX ADMIN — QUETIAPINE FUMARATE 300 MG: 300 TABLET ORAL at 21:33

## 2019-10-13 RX ADMIN — INSULIN GLARGINE 28 UNITS: 100 INJECTION, SOLUTION SUBCUTANEOUS at 09:28

## 2019-10-13 RX ADMIN — SERTRALINE HYDROCHLORIDE 150 MG: 50 TABLET ORAL at 21:33

## 2019-10-13 RX ADMIN — HEPARIN SODIUM 5000 UNITS: 5000 INJECTION INTRAVENOUS; SUBCUTANEOUS at 21:33

## 2019-10-13 RX ADMIN — PANTOPRAZOLE SODIUM 20 MG: 20 TABLET, DELAYED RELEASE ORAL at 21:33

## 2019-10-13 RX ADMIN — GUAIFENESIN 1200 MG: 600 TABLET, EXTENDED RELEASE ORAL at 10:08

## 2019-10-13 RX ADMIN — OXYCODONE HYDROCHLORIDE 10 MG: 5 TABLET ORAL at 01:15

## 2019-10-13 RX ADMIN — PANTOPRAZOLE SODIUM 20 MG: 20 TABLET, DELAYED RELEASE ORAL at 06:45

## 2019-10-13 RX ADMIN — AMLODIPINE BESYLATE 10 MG: 5 TABLET ORAL at 10:08

## 2019-10-13 RX ADMIN — CEFEPIME HYDROCHLORIDE 1 G: 1 INJECTION, POWDER, FOR SOLUTION INTRAMUSCULAR; INTRAVENOUS at 18:14

## 2019-10-13 RX ADMIN — IPRATROPIUM BROMIDE AND ALBUTEROL SULFATE 3 ML: .5; 3 SOLUTION RESPIRATORY (INHALATION) at 20:19

## 2019-10-13 RX ADMIN — ARFORMOTEROL TARTRATE 15 MCG: 15 SOLUTION RESPIRATORY (INHALATION) at 20:19

## 2019-10-13 RX ADMIN — METHYLPREDNISOLONE SODIUM SUCCINATE 60 MG: 125 INJECTION, POWDER, FOR SOLUTION INTRAMUSCULAR; INTRAVENOUS at 18:00

## 2019-10-13 RX ADMIN — SERTRALINE HYDROCHLORIDE 100 MG: 50 TABLET ORAL at 10:06

## 2019-10-13 RX ADMIN — MONTELUKAST 10 MG: 10 TABLET, FILM COATED ORAL at 21:33

## 2019-10-13 RX ADMIN — IPRATROPIUM BROMIDE AND ALBUTEROL SULFATE 3 ML: .5; 3 SOLUTION RESPIRATORY (INHALATION) at 09:35

## 2019-10-13 RX ADMIN — METHYLPREDNISOLONE SODIUM SUCCINATE 60 MG: 125 INJECTION, POWDER, FOR SOLUTION INTRAMUSCULAR; INTRAVENOUS at 23:05

## 2019-10-13 RX ADMIN — INSULIN LISPRO 10 UNITS: 100 INJECTION, SOLUTION INTRAVENOUS; SUBCUTANEOUS at 23:05

## 2019-10-13 RX ADMIN — GABAPENTIN 600 MG: 300 CAPSULE ORAL at 21:32

## 2019-10-13 RX ADMIN — HEPARIN SODIUM 5000 UNITS: 5000 INJECTION INTRAVENOUS; SUBCUTANEOUS at 15:05

## 2019-10-13 RX ADMIN — INSULIN LISPRO 10 UNITS: 100 INJECTION, SOLUTION INTRAVENOUS; SUBCUTANEOUS at 17:58

## 2019-10-13 RX ADMIN — OXYCODONE HYDROCHLORIDE 10 MG: 5 TABLET ORAL at 06:45

## 2019-10-13 RX ADMIN — CEFEPIME HYDROCHLORIDE 1 G: 1 INJECTION, POWDER, FOR SOLUTION INTRAMUSCULAR; INTRAVENOUS at 06:00

## 2019-10-13 NOTE — ROUTINE PROCESS
Assumed care of patient at 1944 report received from [facility] with SBAR, Intake & Output. Pt awake, alert and oriented X 3.  02 at 2L/NC. No sob, No cp. No verbal distress noted. Bed in lowest position & wheels locked. Call bell within reach. 46 -  Notified Dr. Maria Lepe for   And rechecked . Orders received to give extra 10 unit of Humalog now. 10/14/19  
 
0300 - Patient resting quietly and no respiratory distress noted. 1471 - Pain med given. 4608 - Reassess pain level. 6648 - Bedside and Verbal shift change report given to FrancineRN (oncoming nurse) by Leonie Solorzano RN BSN ( off going Nurse ) inclusive of SBAR and plan of care, Intake & Output.

## 2019-10-13 NOTE — CONSULTS
[facility]  Name: Gerson Collins   : 1960   MRN: [id number]   Date: 10/13/2019    []I have reviewed the flowsheet and previous days notes. []The patient is unable to give any meaningful history or review of systems because the patient is:  []Intubated []Sedated   []Unresponsive      []The patient is critically ill on      []Mechanical ventilation []Pressors   []BiPAP []   S: Feels better, NAD, less dyspnea, cough, sputum vlume and less purulence, no fever, chills or hemooptysis              ROS:A comprehensive review of systems was negative except for that written in the HPI. Events and notes from last 24 hours reviewed. Care plan discussed on multidisciplinary rounds. Vital Signs:    Visit Vitals  /72   Pulse 76   Temp 98.6 °F (37 °C)   Resp 20   Ht 5' 7\" (1.702 m)   Wt 113.4 kg (250 lb)   SpO2 97%   Breastfeeding? No   BMI 39.16 kg/m²       O2 Device: Nasal cannula   O2 Flow Rate (L/min): 3 l/min   Temp (24hrs), Av.3 °F (36.8 °C), Min:97.7 °F (36.5 °C), Max:98.8 °F (37.1 °C)       Intake/Output:   Last shift:      10/13 0701 - 10/13 1900  In: 360 [P.O.:360]  Out: -   Last 3 shifts: 10/11 1901 - 10/13 0700  In: 200 [I.V.:200]  Out: -     Intake/Output Summary (Last 24 hours) at 10/13/2019 1105  Last data filed at 10/13/2019 0830  Gross per 24 hour   Intake 460 ml   Output    Net 460 ml     Hemodynamics:       :      Ventilator Settings:                Physical Exam:    General: in no apparent distress, non-toxic, in no respiratory distress and acyanotic, alert, oriented times 3 and afebrile   HEENT: Normal   Neck: No abnormally enlarged lymph nodes.    Chest: increased AP diameter   Lungs: decreased air exchange bilaterally, distant lung sounds, end expiratory wheeze, prolonged expiration and rhonchi  no dullness/ tenderness/ rash   Heart: Regular rate and rhythm or S1S2 present   Abdomen: non distended, bowel sounds normoactive, tympanic, abdomen is soft without significant tenderness, masses, organomegaly or guarding   Extremity: Trace edema   Neuro: alert   Skin: Skin color, texture, turgor normal. No rashes or lesions      DATA:   Current Facility-Administered Medications   Medication Dose Route Frequency    [START ON 10/14/2019] insulin glargine (LANTUS) injection 34 Units  34 Units SubCUTAneous DAILY    insulin glargine (LANTUS) injection 4 Units  4 Units SubCUTAneous ONCE    oxyCODONE IR (ROXICODONE) tablet 10 mg  10 mg Oral Q6H PRN    arformoterol (BROVANA) neb solution 15 mcg  15 mcg Nebulization BID RT    albuterol-ipratropium (DUO-NEB) 2.5 MG-0.5 MG/3 ML  3 mL Nebulization QID RT    cefepime (MAXIPIME) 1 g in 0.9% sodium chloride (MBP/ADV) 50 mL MBP  1 g IntraVENous Q12H    chlorpheniramine-HYDROcodone (TUSSIONEX) oral suspension 5 mL  5 mL Oral Q12H PRN    pantoprazole (PROTONIX) tablet 20 mg  20 mg Oral ACB/HS    guaiFENesin ER (MUCINEX) tablet 1,200 mg  1,200 mg Oral Q12H    insulin lispro (HUMALOG) injection 10 Units  10 Units SubCUTAneous TIDAC    albuterol (PROVENTIL VENTOLIN) nebulizer solution 2.5 mg  2.5 mg Nebulization Q1H PRN    naloxone (NARCAN) injection 0.4 mg  0.4 mg IntraVENous PRN    insulin lispro (HUMALOG) injection   SubCUTAneous QID    cyclobenzaprine (FLEXERIL) tablet 10 mg  10 mg Oral Q12H PRN    cetirizine (ZYRTEC) tablet 10 mg  10 mg Oral DAILY    hydrOXYzine HCl (ATARAX) tablet 25 mg  25 mg Oral Q12H PRN    amLODIPine (NORVASC) tablet 10 mg  10 mg Oral DAILY    clonazePAM (KlonoPIN) tablet 1 mg  1 mg Oral TID PRN    gabapentin (NEURONTIN) capsule 600 mg  600 mg Oral TID    montelukast (SINGULAIR) tablet 10 mg  10 mg Oral QHS    QUEtiapine (SEROquel) tablet 300 mg  300 mg Oral QHS    sertraline (ZOLOFT) tablet 150 mg  150 mg Oral QHS    acetaminophen (TYLENOL) tablet 650 mg  650 mg Oral Q4H PRN    heparin (porcine) injection 5,000 Units  5,000 Units SubCUTAneous Q8H    glucose chewable tablet 16 g  4 Tab Oral PRN  glucagon (GLUCAGEN) injection 1 mg  1 mg IntraMUSCular PRN    methylPREDNISolone (PF) (Solu-MEDROL) injection 60 mg  60 mg IntraVENous Q8H    dextrose 10 % infusion 125-250 mL  125-250 mL IntraVENous PRN    sertraline (ZOLOFT) tablet 100 mg  100 mg Oral DAILY       Telemetry: []Sinus []A-flutter []Paced    []A-fib []Multiple PVCs                  Labs:  Recent Labs     10/13/19  0448 10/12/19  0445 10/11/19  0420   WBC 21.9* 15.7* 16.9*   HGB 8.6* 8.8* 8.3*   HCT 28.7* 29.3* 28.1*    349 376     Recent Labs     10/13/19  0448 10/12/19  0445 10/11/19  0420    134* 133*   K 4.8 4.8 4.9   CL 99* 101 99*   CO2 29 29 27   * 358* 490*   BUN 22* 22* 23*   CREA 0.79 0.88 1.02   CA 8.7 8.2* 8.4*   MG 2.4 2.7* 2.8*   PHOS 3.8 3.1 2.6     No results for input(s): PH, PCO2, PO2, HCO3, FIO2 in the last 72 hours.     Imaging:  [x]I have personally reviewed the patients radiographs  []Radiographs reviewed with radiologist   [x] No CXR study available for review today   []No change from prior, tubes and lines in adequate position  []Improved   []Worsening       IMPRESSION:   Patient Active Problem List   Diagnosis Code    Hand pain M79.643    Knee pain, bilateral M25.561, M25.562    Arthralgia M25.50    Encounter for long-term (current) use of other medications Z79.899    Chronic pain syndrome G89.4    Obesity E66.9    Generalized OA M15.9    History of depression Z86.59    LUCIA (generalized anxiety disorder) F41.1    History of substance abuse ([facility] [address] F19.11    Tobacco abuse disorder Z72.0    Osteoarthritis of right knee M17.11    Encephalopathy G93.40    PTSD (post-traumatic stress disorder) F43.10    Depression F32.9    COPD exacerbation ([facility] [address] J44.1    DM (diabetes mellitus) ([address] E11.9    Acute respiratory failure with hypoxemia ([facility] J96.01    Asthma exacerbation J45.901   Cough  Dyspnea  Tracheobronchomalacia  Morbid obesity  Obstructive sleep apnea  Anemia of unclear etiology  Asthma/COPD Ex  Tobacco abuse  Diabetes mellitus  · Gastroesophageal reflux disease   PLAN:   Split Protonix to twice daily  Repeat PSG  Add LABA  Regular use of acapella  Cefepime for 5 days  Tussionex  Consider changing guaifenesin to 1200 mg bid tablet  Consider esophogram and gastric emptying studies  Check SPO2 at DC time     The patient is: [] acutely ill Risk of deterioration: [] moderate    [] critically ill  [] high     []See my orders for details    My assessment, plan of care, findings, medications, side effects etc were discussed with:  [x]nursing []PT/OT    [x]respiratory therapy [x]   [x]family: None available []     []Total critical care time exclusive of procedures   25 minutes  Raymond Dove MD

## 2019-10-13 NOTE — ROUTINE PROCESS
Received verbal bedside report from [facility]. Pt awake, alert and oriented x 4, denies pain,, No signs of distress noted at this time. Call bell within reach, bed in the lowest locked position. 1940 - Bedside and Verbal shift change report given to Milton Petty RN (oncoming nurse) by Rosendo Moreno RN (offgoing nurse). Report included the following information SBAR, Intake/Output, MAR and Recent Results.

## 2019-10-13 NOTE — PROGRESS NOTES
Respiratory Therapy Assessment Care Plan    Patient:  Mackie Gitelman 64 y.o. female 10/13/2019 1:36 PM    COPD exacerbation ([facility] [J44.1]  Asthma exacerbation [J45.901]      Chest X-RAY:   Results from Hospital Encounter encounter on 10/08/19   XR CHEST PA LAT    Impression IMPRESSION:    No significant interval change or acute pulmonary process identified. Results from [place] encounter on 05/31/16   XR KNEE RT MAX 2 VWS    Impression IMPRESSION:    Right total knee arthroplasty in good anatomic alignment. Results from [place] encounter on 05/26/13   XR ABD FLAT/ ERECT    Impression IMPRESSION: No acute findings in the chest or abdomen                  Vital Signs:   Visit Vitals  /72   Pulse 76   Temp 98.6 °F (37 °C)   Resp 20   Ht 5' 7\" (1.702 m)   Wt 113.4 kg (250 lb)   SpO2 97%   Breastfeeding? No   BMI 39.16 kg/m²           Indications for treatment: COPD, ASTHMA      Plan of care: Duoneb Q4  Brovana Bid and Aerobika Q4,  Oxygen Therapy        Goal:Decrease work of breathing, improve gas exchange.

## 2019-10-13 NOTE — PROGRESS NOTES
Progress Note      Patient: Jesús Callahan               Sex: female          DOA: 10/8/2019       YOB: 1960      Age:  64 y.o.        LOS:  LOS: 3 days             CHIEF COMPLAINT:  Cough and Lethargy      Assessment/Plan:     Principal Problem:    Acute respiratory failure with hypoxemia ([facility] (10/8/2019)    Active Problems:    Obesity (11/15/2012)      COPD exacerbation ([facility] (10/8/2019)      DM (diabetes mellitus) ([facility] (10/8/2019)      Asthma exacerbation (10/10/2019)        PLAN:  COPD exacerbation  Cont steroids, nebulized bronchodilators, antibiotics. Switch atbx to cefepime per Pulm; day 3/5, complete 2 more days  Start chest PT  Check sputum culture and urine antigens, culture pending, shows few GNR  Start robitussin with codeine d/t severe frequent cough  Check RVP  CTA chest is neg for PE but does show tracheomalacia and alveolitis. Consulted Pulm, appreciate recs      Diabetes mellitus, type 2  Hyperglycemia due to steroids  Increase lantus  Schedule novolog  Cont sliding scale insulin with glycemic control protocol      DVT ppx    Subjective:     Pt states continues to feel better. Wheezing and cough improved today. Less SOB with exertion. Objective:     Visit Vitals  /72   Pulse 76   Temp 98.6 °F (37 °C)   Resp 20   Ht 5' 7\" (1.702 m)   Wt 113.4 kg (250 lb)   SpO2 97%   Breastfeeding? No   BMI 39.16 kg/m²        Physical Exam:  Ears: hearing is intact  Eyes: Icterus is not present  Lungs: wheezes bilaterally, improved  Heart: regular rate and rhythm, S1, S2 normal, no murmur, click, rub or gallop  Gastrointestinal: soft, non-tender.  Bowel sounds normal. No masses,  no organomegaly  Neurological:  New Focal Deficits are not present  Psychiatric:  Mood is stable    Lab/Data Reviewed:  CMP:   Lab Results   Component Value Date/Time     10/13/2019 04:48 AM    K 4.8 10/13/2019 04:48 AM    CL 99 (L) 10/13/2019 04:48 AM    CO2 29 10/13/2019 04:48 AM    AGAP 8 10/13/2019 04:48 AM     (H) 10/13/2019 04:48 AM    BUN 22 (H) 10/13/2019 04:48 AM    CREA 0.79 10/13/2019 04:48 AM    GFRAA >60 10/13/2019 04:48 AM    GFRNA >60 10/13/2019 04:48 AM    CA 8.7 10/13/2019 04:48 AM    MG 2.4 10/13/2019 04:48 AM    PHOS 3.8 10/13/2019 04:48 AM     CBC:   Lab Results   Component Value Date/Time    WBC 21.9 (H) 10/13/2019 04:48 AM    HGB 8.6 (L) 10/13/2019 04:48 AM    HCT 28.7 (L) 10/13/2019 04:48 AM     10/13/2019 04:48 AM     All Cardiac Markers in the last 24 hours:   No results found for: CPK, CK, CKMMB, CKMB, RCK3, CKMBT, CKNDX, CKND1, HUBER, TROPT, TROIQ, KAL, TROPT, TNIPOC, BNP, BNPP  Recent Glucose Results:   Lab Results   Component Value Date/Time     (H) 10/13/2019 04:48 AM       Imaging Reviewed:  No results found.

## 2019-10-13 NOTE — PROGRESS NOTES
Problem: Falls - Risk of  Goal: *Absence of Falls  Description  Document Gautam Liao Fall Risk and appropriate interventions in the flowsheet.   Outcome: Progressing Towards Goal  Note:   Fall Risk Interventions: bed is locked and in lowest position, side rails up x 2, call bell and bedside table are within reach            Medication Interventions: Evaluate medications/consider consulting pharmacy, Teach patient to arise slowly    Elimination Interventions: Call light in reach, Toileting schedule/hourly rounds              Problem: Patient Education: Go to Patient Education Activity  Goal: Patient/Family Education  Outcome: Progressing Towards Goal

## 2019-10-14 LAB
ANION GAP SERPL CALC-SCNC: 5 MMOL/L (ref 3–18)
BASOPHILS # BLD: 0 K/UL (ref 0–0.1)
BASOPHILS NFR BLD: 0 % (ref 0–3)
BUN SERPL-MCNC: 20 MG/DL (ref 7–18)
BUN/CREAT SERPL: 24 (ref 12–20)
CALCIUM SERPL-MCNC: 8.6 MG/DL (ref 8.5–10.1)
CHLORIDE SERPL-SCNC: 99 MMOL/L (ref 100–111)
CO2 SERPL-SCNC: 31 MMOL/L (ref 21–32)
CREAT SERPL-MCNC: 0.82 MG/DL (ref 0.6–1.3)
DIFFERENTIAL METHOD BLD: ABNORMAL
EOSINOPHIL # BLD: 0 K/UL (ref 0–0.4)
EOSINOPHIL NFR BLD: 0 % (ref 0–5)
ERYTHROCYTE [DISTWIDTH] IN BLOOD BY AUTOMATED COUNT: 17.8 % (ref 11.6–14.5)
GLUCOSE BLD STRIP.AUTO-MCNC: 345 MG/DL (ref 70–110)
GLUCOSE BLD STRIP.AUTO-MCNC: 347 MG/DL (ref 70–110)
GLUCOSE BLD STRIP.AUTO-MCNC: 358 MG/DL (ref 70–110)
GLUCOSE BLD STRIP.AUTO-MCNC: 380 MG/DL (ref 70–110)
GLUCOSE SERPL-MCNC: 274 MG/DL (ref 74–99)
HCT VFR BLD AUTO: 28.9 % (ref 35–45)
HGB BLD-MCNC: 8.6 G/DL (ref 12–16)
LYMPHOCYTES # BLD: 5.4 K/UL (ref 0.8–3.5)
LYMPHOCYTES NFR BLD: 24 % (ref 20–51)
MAGNESIUM SERPL-MCNC: 2.5 MG/DL (ref 1.6–2.6)
MCH RBC QN AUTO: 21.4 PG (ref 24–34)
MCHC RBC AUTO-ENTMCNC: 29.8 G/DL (ref 31–37)
MCV RBC AUTO: 71.9 FL (ref 74–97)
METAMYELOCYTES NFR BLD MANUAL: 2 %
MONOCYTES # BLD: 0.9 K/UL (ref 0–1)
MONOCYTES NFR BLD: 4 % (ref 2–9)
NEUTS BAND NFR BLD MANUAL: 1 % (ref 0–5)
NEUTS SEG # BLD: 15.6 K/UL (ref 1.8–8)
NEUTS SEG NFR BLD: 69 % (ref 42–75)
PHOSPHATE SERPL-MCNC: 3.6 MG/DL (ref 2.5–4.9)
PLATELET # BLD AUTO: 371 K/UL (ref 135–420)
PLATELET COMMENTS,PCOM: ABNORMAL
PMV BLD AUTO: 9.7 FL (ref 9.2–11.8)
POTASSIUM SERPL-SCNC: 4.6 MMOL/L (ref 3.5–5.5)
RBC # BLD AUTO: 4.02 M/UL (ref 4.2–5.3)
RBC MORPH BLD: ABNORMAL
SODIUM SERPL-SCNC: 135 MMOL/L (ref 136–145)
WBC # BLD AUTO: 22.6 K/UL (ref 4.6–13.2)

## 2019-10-14 PROCEDURE — 85025 COMPLETE CBC W/AUTO DIFF WBC: CPT

## 2019-10-14 PROCEDURE — 83735 ASSAY OF MAGNESIUM: CPT

## 2019-10-14 PROCEDURE — 74011636637 HC RX REV CODE- 636/637: Performed by: HOSPITALIST

## 2019-10-14 PROCEDURE — 74011250636 HC RX REV CODE- 250/636: Performed by: INTERNAL MEDICINE

## 2019-10-14 PROCEDURE — 74011636637 HC RX REV CODE- 636/637: Performed by: INTERNAL MEDICINE

## 2019-10-14 PROCEDURE — 74011250637 HC RX REV CODE- 250/637: Performed by: INTERNAL MEDICINE

## 2019-10-14 PROCEDURE — 94761 N-INVAS EAR/PLS OXIMETRY MLT: CPT

## 2019-10-14 PROCEDURE — 74011000250 HC RX REV CODE- 250: Performed by: INTERNAL MEDICINE

## 2019-10-14 PROCEDURE — 74011250636 HC RX REV CODE- 250/636: Performed by: HOSPITALIST

## 2019-10-14 PROCEDURE — 82962 GLUCOSE BLOOD TEST: CPT

## 2019-10-14 PROCEDURE — 74011250637 HC RX REV CODE- 250/637: Performed by: HOSPITALIST

## 2019-10-14 PROCEDURE — 65270000029 HC RM PRIVATE

## 2019-10-14 PROCEDURE — 77010033678 HC OXYGEN DAILY

## 2019-10-14 PROCEDURE — 36415 COLL VENOUS BLD VENIPUNCTURE: CPT

## 2019-10-14 PROCEDURE — 84100 ASSAY OF PHOSPHORUS: CPT

## 2019-10-14 PROCEDURE — 97162 PT EVAL MOD COMPLEX 30 MIN: CPT

## 2019-10-14 PROCEDURE — 94640 AIRWAY INHALATION TREATMENT: CPT

## 2019-10-14 PROCEDURE — 94668 MNPJ CHEST WALL SBSQ: CPT

## 2019-10-14 PROCEDURE — 80048 BASIC METABOLIC PNL TOTAL CA: CPT

## 2019-10-14 PROCEDURE — 74011000258 HC RX REV CODE- 258: Performed by: INTERNAL MEDICINE

## 2019-10-14 RX ORDER — INSULIN GLARGINE 100 [IU]/ML
45 INJECTION, SOLUTION SUBCUTANEOUS DAILY
Status: DISCONTINUED | OUTPATIENT
Start: 2019-10-15 | End: 2019-10-15

## 2019-10-14 RX ORDER — INSULIN GLARGINE 100 [IU]/ML
16 INJECTION, SOLUTION SUBCUTANEOUS ONCE
Status: COMPLETED | OUTPATIENT
Start: 2019-10-14 | End: 2019-10-14

## 2019-10-14 RX ORDER — INSULIN LISPRO 100 [IU]/ML
12 INJECTION, SOLUTION INTRAVENOUS; SUBCUTANEOUS
Status: DISCONTINUED | OUTPATIENT
Start: 2019-10-14 | End: 2019-10-15

## 2019-10-14 RX ADMIN — CEFEPIME HYDROCHLORIDE 1 G: 1 INJECTION, POWDER, FOR SOLUTION INTRAMUSCULAR; INTRAVENOUS at 20:48

## 2019-10-14 RX ADMIN — ARFORMOTEROL TARTRATE 15 MCG: 15 SOLUTION RESPIRATORY (INHALATION) at 20:38

## 2019-10-14 RX ADMIN — HEPARIN SODIUM 5000 UNITS: 5000 INJECTION INTRAVENOUS; SUBCUTANEOUS at 22:27

## 2019-10-14 RX ADMIN — OXYCODONE HYDROCHLORIDE 10 MG: 5 TABLET ORAL at 18:38

## 2019-10-14 RX ADMIN — INSULIN LISPRO 12 UNITS: 100 INJECTION, SOLUTION INTRAVENOUS; SUBCUTANEOUS at 17:36

## 2019-10-14 RX ADMIN — INSULIN LISPRO 12 UNITS: 100 INJECTION, SOLUTION INTRAVENOUS; SUBCUTANEOUS at 12:41

## 2019-10-14 RX ADMIN — HEPARIN SODIUM 5000 UNITS: 5000 INJECTION INTRAVENOUS; SUBCUTANEOUS at 06:35

## 2019-10-14 RX ADMIN — PANTOPRAZOLE SODIUM 20 MG: 20 TABLET, DELAYED RELEASE ORAL at 06:35

## 2019-10-14 RX ADMIN — GUAIFENESIN 1200 MG: 600 TABLET, EXTENDED RELEASE ORAL at 20:47

## 2019-10-14 RX ADMIN — INSULIN LISPRO 15 UNITS: 100 INJECTION, SOLUTION INTRAVENOUS; SUBCUTANEOUS at 09:10

## 2019-10-14 RX ADMIN — CEFEPIME HYDROCHLORIDE 1 G: 1 INJECTION, POWDER, FOR SOLUTION INTRAMUSCULAR; INTRAVENOUS at 06:35

## 2019-10-14 RX ADMIN — MONTELUKAST 10 MG: 10 TABLET, FILM COATED ORAL at 22:27

## 2019-10-14 RX ADMIN — IPRATROPIUM BROMIDE AND ALBUTEROL SULFATE 3 ML: .5; 3 SOLUTION RESPIRATORY (INHALATION) at 20:38

## 2019-10-14 RX ADMIN — OXYCODONE HYDROCHLORIDE 10 MG: 5 TABLET ORAL at 12:37

## 2019-10-14 RX ADMIN — OXYCODONE HYDROCHLORIDE 10 MG: 5 TABLET ORAL at 06:38

## 2019-10-14 RX ADMIN — INSULIN GLARGINE 34 UNITS: 100 INJECTION, SOLUTION SUBCUTANEOUS at 09:09

## 2019-10-14 RX ADMIN — QUETIAPINE FUMARATE 300 MG: 300 TABLET ORAL at 22:27

## 2019-10-14 RX ADMIN — SERTRALINE HYDROCHLORIDE 100 MG: 50 TABLET ORAL at 09:31

## 2019-10-14 RX ADMIN — GABAPENTIN 600 MG: 300 CAPSULE ORAL at 22:27

## 2019-10-14 RX ADMIN — IPRATROPIUM BROMIDE AND ALBUTEROL SULFATE 3 ML: .5; 3 SOLUTION RESPIRATORY (INHALATION) at 11:52

## 2019-10-14 RX ADMIN — INSULIN LISPRO 12 UNITS: 100 INJECTION, SOLUTION INTRAVENOUS; SUBCUTANEOUS at 09:10

## 2019-10-14 RX ADMIN — GABAPENTIN 600 MG: 300 CAPSULE ORAL at 09:31

## 2019-10-14 RX ADMIN — METHYLPREDNISOLONE SODIUM SUCCINATE 60 MG: 125 INJECTION, POWDER, FOR SOLUTION INTRAMUSCULAR; INTRAVENOUS at 09:31

## 2019-10-14 RX ADMIN — IPRATROPIUM BROMIDE AND ALBUTEROL SULFATE 3 ML: .5; 3 SOLUTION RESPIRATORY (INHALATION) at 16:29

## 2019-10-14 RX ADMIN — AMLODIPINE BESYLATE 10 MG: 5 TABLET ORAL at 09:31

## 2019-10-14 RX ADMIN — GUAIFENESIN 1200 MG: 600 TABLET, EXTENDED RELEASE ORAL at 09:31

## 2019-10-14 RX ADMIN — INSULIN LISPRO 12 UNITS: 100 INJECTION, SOLUTION INTRAVENOUS; SUBCUTANEOUS at 17:37

## 2019-10-14 RX ADMIN — INSULIN LISPRO 15 UNITS: 100 INJECTION, SOLUTION INTRAVENOUS; SUBCUTANEOUS at 22:28

## 2019-10-14 RX ADMIN — INSULIN GLARGINE 16 UNITS: 100 INJECTION, SOLUTION SUBCUTANEOUS at 12:41

## 2019-10-14 RX ADMIN — HYDROCODONE POLISTIREX AND CHLORPHENIRAMINE POLISTIREX 5 ML: 10; 8 SUSPENSION, EXTENDED RELEASE ORAL at 17:47

## 2019-10-14 RX ADMIN — CETIRIZINE HYDROCHLORIDE 10 MG: 10 TABLET, FILM COATED ORAL at 09:31

## 2019-10-14 RX ADMIN — INSULIN LISPRO 12 UNITS: 100 INJECTION, SOLUTION INTRAVENOUS; SUBCUTANEOUS at 12:40

## 2019-10-14 RX ADMIN — SERTRALINE HYDROCHLORIDE 150 MG: 50 TABLET ORAL at 22:27

## 2019-10-14 RX ADMIN — IPRATROPIUM BROMIDE AND ALBUTEROL SULFATE 3 ML: .5; 3 SOLUTION RESPIRATORY (INHALATION) at 08:12

## 2019-10-14 RX ADMIN — HEPARIN SODIUM 5000 UNITS: 5000 INJECTION INTRAVENOUS; SUBCUTANEOUS at 15:30

## 2019-10-14 RX ADMIN — PANTOPRAZOLE SODIUM 20 MG: 20 TABLET, DELAYED RELEASE ORAL at 22:27

## 2019-10-14 RX ADMIN — GABAPENTIN 600 MG: 300 CAPSULE ORAL at 17:37

## 2019-10-14 RX ADMIN — ARFORMOTEROL TARTRATE 15 MCG: 15 SOLUTION RESPIRATORY (INHALATION) at 08:12

## 2019-10-14 NOTE — PROGRESS NOTES
Internal Medicine Progress Note        NAME: Mackie Gitelman   :  1960  MRM:  [id number]    Date/Time: 10/14/2019        ASSESSMENT/PLAN:     #   Acute respiratory failure with hypoxemia. Not on home O2, will verify prior DC. Maintain O2. PCCM on board. # Acute COPD exacerbation with Acute Bronchitis   Failed levaquin course . Report she responded to change Abx, changed by PCCM to cefepime. Treated with   steroids, nebulized bronchodilators, antibiotics. Switch atbx to cefepime per Pulm; day 4/5. Start chest PT  Check sputum culture and urine antigens, culture pending, shows few [place]  . Start robitussin with codeine d/t severe frequent cough  CTA chest is neg for PE but does show tracheomalacia and alveolitis. Consulted Pulm, appreciate recs.         # Diabetes mellitus, type 2 . Provide SSI, hypoglycemia protocol and frequent Accu checks. Provide SSI, hypoglycemia protocol and frequent Accu checks. Education,  diabetic educator   Hyperglycemia due to steroids. Increased lantus. # Obesity . Body mass index is 39.16 kg/m². # Quit smoking about 3 months ago, slipped at times. Counseled to complete quit      # Deconditioned .  PT.     -Code status : FULL     Lab Review:     Recent Labs     10/14/19  0315 10/13/19  0448 10/12/19  0445   WBC 22.6* 21.9* 15.7*   HGB 8.6* 8.6* 8.8*   HCT 28.9* 28.7* 29.3*    361 349     Recent Labs     10/14/19  0315 10/13/19  0448 10/12/19  0445   * 136 134*   K 4.6 4.8 4.8   CL 99* 99* 101   CO2 31 29 29   * 194* 358*   BUN 20* 22* 22*   CREA 0.82 0.79 0.88   CA 8.6 8.7 8.2*   MG 2.5 2.4 2.7*   PHOS 3.6 3.8 3.1     Lab Results   Component Value Date/Time    Glucose (POC) 380 (H) 10/14/2019 09:02 AM    Glucose (POC) 472 (HH) 10/13/2019 10:42 PM    Glucose (POC) 505 (HH) 10/13/2019 09:31 PM    Glucose (POC) 488 (HH) 10/13/2019 05:26 PM    Glucose (POC) 334 (H) 10/13/2019 11:51 AM    Glucose,  (H) 2010 02:20 AM     No results for input(s): PH, PCO2, PO2, HCO3, FIO2 in the last 72 hours. No results for input(s): INR, INREXT in the last 72 hours. Lab Results   Component Value Date/Time    Specimen Description: BLOOD 01/09/2011 01:45 AM    Specimen Description: BLOOD 01/09/2011 01:30 AM    Specimen Description: SPUTUM 08/19/2010 01:00 PM     Lab Results   Component Value Date/Time    Culture result: FEW KLEBSIELLA PNEUMONIAE (A) 10/09/2019 10:44 AM    Culture result: MODERATE NORMAL RESPIRATORY STEPHON 10/09/2019 10:44 AM    Culture result:  05/12/2016 12:03 PM     MRSA target DNA is not detected (presumptive not colonized with MRSA)       All Cardiac Markers in the last 24 hours: No results found for: CPK, CK, CKMMB, CKMB, RCK3, CKMBT, CKNDX, CKND1, HUBER, TROPT, TROIQ, KAL, TROPT, TNIPOC, BNP, BNPP           Subjective:     Chief Complaint:      SOB for weeks, now better    ROS:  (bold if positive,otherwise negative)    Fever/chills ,  Dysuria   Cough ,   SOB/LYNCH , Chest Pain     Diarrhea ,Nausea/Vomit , Abd Pain , Constipation     Tolerating Diet                Objective:     Vitals:  Last 24hrs VS reviewed since prior progress note. Most recent are:    Visit Vitals  /68   Pulse 77   Temp 98.1 °F (36.7 °C)   Resp 20   Ht 5' 7\" (1.702 m)   Wt 113.4 kg (250 lb)   SpO2 98%   Breastfeeding? No   BMI 39.16 kg/m²     SpO2 Readings from Last 6 Encounters:   10/14/19 98%   09/24/19 98%   12/21/18 93%   11/16/18 97%   10/12/18 96%   06/02/16 97%    O2 Flow Rate (L/min): 3 l/min       Intake/Output Summary (Last 24 hours) at 10/14/2019 0916  Last data filed at 10/13/2019 1814  Gross per 24 hour   Intake 50 ml   Output    Net 50 ml          Physical Exam:   Ears: hearing is intact  Eyes: Icterus is not present  Lungs: wheezes bilaterally,   Heart: regular rate and rhythm, S1, S2 normal, no murmur, click, rub or gallop  Gastrointestinal: soft, non-tender.  Bowel sounds normal. No masses,  no organomegaly  Neurological: New Focal Deficits are not present  Psychiatric:  Mood is stable    Medications Reviewed: (see below)    Lab Data Reviewed: (see below)    ______________________________________________________________________    Medications:     Current Facility-Administered Medications   Medication Dose Route Frequency    insulin lispro (HUMALOG) injection 12 Units  12 Units SubCUTAneous TIDPC    insulin glargine (LANTUS) injection 34 Units  34 Units SubCUTAneous DAILY    oxyCODONE IR (ROXICODONE) tablet 10 mg  10 mg Oral Q6H PRN    arformoterol (BROVANA) neb solution 15 mcg  15 mcg Nebulization BID RT    albuterol-ipratropium (DUO-NEB) 2.5 MG-0.5 MG/3 ML  3 mL Nebulization QID RT    cefepime (MAXIPIME) 1 g in 0.9% sodium chloride (MBP/ADV) 50 mL MBP  1 g IntraVENous Q12H    chlorpheniramine-HYDROcodone (TUSSIONEX) oral suspension 5 mL  5 mL Oral Q12H PRN    pantoprazole (PROTONIX) tablet 20 mg  20 mg Oral ACB/HS    guaiFENesin ER (MUCINEX) tablet 1,200 mg  1,200 mg Oral Q12H    albuterol (PROVENTIL VENTOLIN) nebulizer solution 2.5 mg  2.5 mg Nebulization Q1H PRN    naloxone (NARCAN) injection 0.4 mg  0.4 mg IntraVENous PRN    insulin lispro (HUMALOG) injection   SubCUTAneous QID    cyclobenzaprine (FLEXERIL) tablet 10 mg  10 mg Oral Q12H PRN    cetirizine (ZYRTEC) tablet 10 mg  10 mg Oral DAILY    hydrOXYzine HCl (ATARAX) tablet 25 mg  25 mg Oral Q12H PRN    amLODIPine (NORVASC) tablet 10 mg  10 mg Oral DAILY    clonazePAM (KlonoPIN) tablet 1 mg  1 mg Oral TID PRN    gabapentin (NEURONTIN) capsule 600 mg  600 mg Oral TID    montelukast (SINGULAIR) tablet 10 mg  10 mg Oral QHS    QUEtiapine (SEROquel) tablet 300 mg  300 mg Oral QHS    sertraline (ZOLOFT) tablet 150 mg  150 mg Oral QHS    acetaminophen (TYLENOL) tablet 650 mg  650 mg Oral Q4H PRN    heparin (porcine) injection 5,000 Units  5,000 Units SubCUTAneous Q8H    glucose chewable tablet 16 g  4 Tab Oral PRN    glucagon (GLUCAGEN) injection 1 mg  1 mg IntraMUSCular PRN    methylPREDNISolone (PF) (Solu-MEDROL) injection 60 mg  60 mg IntraVENous Q8H    dextrose 10 % infusion 125-250 mL  125-250 mL IntraVENous PRN    sertraline (ZOLOFT) tablet 100 mg  100 mg Oral DAILY          Total time spent with patient: 35 minutes                  Care Plan discussed with: Patient, Care Manager, Nursing Staff and >50% of time spent in counseling and coordination of care    Discussed:  Care Plan    Prophylaxis:  Hep SQ    Disposition:  Home w/Family             Attending Physician: Rosenda Pichardo MD

## 2019-10-14 NOTE — PROGRESS NOTES
Problem: Diabetes Self-Management  Goal: *Disease process and treatment process  Description  Define diabetes and identify own type of diabetes; list 3 options for treating diabetes. Outcome: Progressing Towards Goal  Goal: *Monitoring blood glucose, interpreting and using results  Description  Identify recommended blood glucose targets  and personal targets.   Outcome: Progressing Towards Goal     Problem: Patient Education: Go to Patient Education Activity  Goal: Patient/Family Education  Outcome: Progressing Towards Goal     Problem: Pain  Goal: *Control of Pain  Outcome: Progressing Towards Goal     Problem: Chronic Obstructive Pulmonary Disease (COPD)  Goal: *Absence of hypoxia  Outcome: Progressing Towards Goal  Goal: *Optimize nutritional status  Outcome: Progressing Towards Goal     Problem: Patient Education: Go to Patient Education Activity  Goal: Patient/Family Education  Outcome: Progressing Towards Goal

## 2019-10-14 NOTE — PROGRESS NOTES
completed follow up visit with patient and offered Pastoral care, see flow sheets for interventions. Chaplains will continue to follow and will provide pastoral care  as needed or requested.        iH Moise, MPH, [address] [phone]

## 2019-10-14 NOTE — PROGRESS NOTES
Problem: Diabetes Self-Management  Goal: *Disease process and treatment process  Description  Define diabetes and identify own type of diabetes; list 3 options for treating diabetes. Outcome: Progressing Towards Goal  Goal: *Incorporating nutritional management into lifestyle  Description  Describe effect of type, amount and timing of food on blood glucose; list 3 methods for planning meals. Outcome: Progressing Towards Goal  Goal: *Incorporating physical activity into lifestyle  Description  State effect of exercise on blood glucose levels. Outcome: Progressing Towards Goal  Goal: *Developing strategies to promote health/change behavior  Description  Define the ABC's of diabetes; identify appropriate screenings, schedule and personal plan for screenings. Outcome: Progressing Towards Goal  Goal: *Using medications safely  Description  State effect of diabetes medications on diabetes; name diabetes medication taking, action and side effects. Outcome: Progressing Towards Goal  Goal: *Monitoring blood glucose, interpreting and using results  Description  Identify recommended blood glucose targets  and personal targets. Outcome: Progressing Towards Goal  Goal: *Prevention, detection, treatment of acute complications  Description  List symptoms of hyper- and hypoglycemia; describe how to treat low blood sugar and actions for lowering  high blood glucose level. Outcome: Progressing Towards Goal  Goal: *Prevention, detection and treatment of chronic complications  Description  Define the natural course of diabetes and describe the relationship of blood glucose levels to long term complications of diabetes.   Outcome: Progressing Towards Goal  Goal: *Developing strategies to address psychosocial issues  Description  Describe feelings about living with diabetes; identify support needed and support network  Outcome: Progressing Towards Goal  Goal: *Sick day guidelines  Outcome: Progressing Towards Goal  Goal: *Patient Specific Goal (EDIT GOAL, INSERT TEXT)  Outcome: Progressing Towards Goal     Problem: Patient Education: Go to Patient Education Activity  Goal: Patient/Family Education  Outcome: Progressing Towards Goal     Problem: Pain  Goal: *Control of Pain  Outcome: Progressing Towards Goal     Problem: Patient Education: Go to Patient Education Activity  Goal: Patient/Family Education  Outcome: Progressing Towards Goal     Problem: Chronic Obstructive Pulmonary Disease (COPD)  Goal: *Oxygen saturation during activity within specified parameters  Outcome: Progressing Towards Goal  Goal: *Able to remain out of bed as prescribed  Outcome: Progressing Towards Goal  Goal: *Absence of hypoxia  Outcome: Progressing Towards Goal  Goal: *Optimize nutritional status  Outcome: Progressing Towards Goal     Problem: Patient Education: Go to Patient Education Activity  Goal: Patient/Family Education  Outcome: Progressing Towards Goal     Problem: Falls - Risk of  Goal: *Absence of Falls  Description  Document [facility] Fall Risk and appropriate interventions in the flowsheet.   Outcome: Progressing Towards Goal  Note:   Fall Risk Interventions:            Medication Interventions: Evaluate medications/consider consulting pharmacy    Elimination Interventions: Call light in reach              Problem: Patient Education: Go to Patient Education Activity  Goal: Patient/Family Education  Outcome: Progressing Towards Goal     Problem: Discharge Planning  Goal: *Discharge to safe environment  Outcome: Progressing Towards Goal     Problem: Diabetes Maintenance:Ongoing  Goal: *Blood Glucose 80 to 180 md/dl  Description  Insulin management with steroids    Outcome: Progressing Towards Goal     Problem: Altered nutrition  Goal: *Optimize nutritional status  Outcome: Progressing Towards Goal     Problem: Patient Education: Go to Patient Education Activity  Goal: Patient/Family Education  Outcome: Progressing Towards Goal

## 2019-10-14 NOTE — PROGRESS NOTES
Respiratory Therapy Assessment Care Plan    Patient:  Franchesca Krause 64 y.o. female 10/14/2019 8:21 AM    COPD exacerbation ([address] [J44.1]  Asthma exacerbation [J45.901]      Chest X-RAY:   Results from Hospital Encounter encounter on 10/08/19   XR CHEST PA LAT    Impression IMPRESSION:    No significant interval change or acute pulmonary process identified. Results from [place] encounter on 05/31/16   XR KNEE RT MAX 2 VWS    Impression IMPRESSION:    Right total knee arthroplasty in good anatomic alignment. Results from [place] encounter on 05/26/13   XR ABD FLAT/ ERECT    Impression IMPRESSION: No acute findings in the chest or abdomen                  Vital Signs:   Visit Vitals  /68   Pulse 77   Temp 98.1 °F (36.7 °C)   Resp 20   Ht 5' 7\" (1.702 m)   Wt 113.4 kg (250 lb)   SpO2 98%   Breastfeeding? No   BMI 39.16 kg/m²           Indications for treatment: COPD and Asthma      Plan of care: Continue Brovana Q12 and Duoneb Q4 as scheduled. Continue use of Aerobika and Acapella to mobilize secretions. Continue oxygen therapy. Goal: Titrate oxygen as tolerated per patient.

## 2019-10-14 NOTE — PROGRESS NOTES
Problem: Mobility Impaired (Adult and Pediatric)  Goal: *Acute Goals and Plan of Care (Insert Text)  Description  Physical Therapy Goals  Initiated 10/14/2019 and to be accomplished within 7 day(s)  1. Patient will move from supine to sit and sit to supine  in bed with modified independence. 2.  Patient will transfer from bed to chair and chair to bed with modified independence using the least restrictive device. 3.  Patient will perform sit to stand with modified independence. 4.  Patient will ambulate with modified independence for 150 feet with the least restrictive device. 5.  Patient will ascend/descend 10 stairs with handrail(s) with modified independence. Outcome: Progressing Towards Goal  PHYSICAL THERAPY EVALUATION    Patient: Will Gray (65 y.o. female)  Date: 10/14/2019  Primary Diagnosis: COPD exacerbation ([facility] [J44.1]  Asthma exacerbation [J45.901]  Precautions: Fall  PLOF: Mod I  ASSESSMENT :  Mod I for supine to sit. Good sitting balance. Supervision for sit to stand and amb 10ft with no AD. Reaching for objects for balance with amb. Amb 10ft for return from bathroom with no AD. Seated EOB with increased respiratory rate. Educated on maintaining supplemental 3L O2 donned during amb to/from bathroom; verbalized understanding. Supervision for sit to supine with elevated HOB. Declines further mobility. Will follow 1-2 times to ensure safety for discharge to home. Education provided on bed mobility, transfers, ADLs, balance, amb, safety, exercise, role of PT, plan of care, cognition, skin integrity, vitals as indicated. Educated on need for RN assistance with mobility; verbalized understanding. Call bell in reach. Patient will benefit from skilled intervention to address the above impairments. Patient's rehabilitation potential is considered to be Good  Factors which may influence rehabilitation potential include:   ? None noted  ? Mental ability/status  ? Medical condition  ? Home/family situation and support systems  ? Safety awareness  ? Pain tolerance/management  ? Other:      PLAN :  Recommendations and Planned Interventions:   ?           Bed Mobility Training             ? Neuromuscular Re-Education  ? Transfer Training                   ? Orthotic/Prosthetic Training  ? Gait Training                          ? Modalities  ? Therapeutic Exercises           ? Edema Management/Control  ? Therapeutic Activities            ? Family Training/Education  ? Patient Education  ? Other (comment):    Frequency/Duration: Patient will be followed by physical therapy 1-2 time a week to address goals. Discharge Recommendations: Outpatient Pulmonary Rehab  Further Equipment Recommendations for Discharge: straight cane; has     SUBJECTIVE:   Patient stated I'm feeling better but not ready for this. I didn't know it'd be so soon.     OBJECTIVE DATA SUMMARY:     Past Medical History:   Diagnosis Date    Arthritis     Asthma     Chronic pain     Bilateral knees    COPD     Diabetes mellitus (HCC)     GERD (gastroesophageal reflux disease)     HTN (hypertension)     Morbid obesity (HCC)     Osteoarthritis of right knee 2016    Psychiatric disorder     Major depression, PTSD, and anxiety     Past Surgical History:   Procedure Laterality Date    HX  SECTION      HX OTHER SURGICAL      Facial reconstruction     Barriers to Learning/Limitations: None  Compensate with: Visual Cues, Verbal Cues, Tactile Cues and Kinesthetic Cues    Home Situation:  Home Situation  Home Environment: Private residence  # Steps to Enter: 0  One/Two Story Residence: Two story  # of Interior Steps: 13  Height of Each Step (in): 0 inches  Interior Rails: (Yes)  Lift Chair Available: No  Living Alone: No  Support Systems: Family member(s)  Patient Expects to be Discharged to[de-identified] Private residence  Current DME Used/Available at Home: Wheelchair, Walker, rolling, Bre Magoffin, straight    Critical Behavior:  Neurologic State: Alert  Orientation Level: Oriented X4  Cognition: Follows commands     Psychosocial  Patient Behaviors: Cooperative    Strength:    Manual Muscle Testing (LE)         R     L    Hip Flexion:   5/5 5/5  Knee EXT:   5/5 5/5  Knee FLEX:   5/5 5/5  Ankle DF:   5/5 5/5  _________________________________________________   Range Of Motion:  BLE AROM WFL  Functional Mobility:  Bed Mobility:  Supine to Sit: Modified independent  Sit to Supine: Modified independent  Transfers:  Sit to Stand: Supervision  Stand to Sit: Supervision  Balance:   Sitting: Intact  Standing: Impaired  Standing - Static: Good  Standing - Dynamic : Good  Ambulation/Gait Training:  Distance (ft): (10ftx2)  Ambulation - Level of Assistance: Supervision    Neuro Re-education:  Seated EOB 3 minutes  Therapeutic Exercises:   Sit to stand x2    Pain:  Pain level pre-treatment: 0/10   Pain level post-treatment: 0/10   Pain Scale 1: Numeric (0 - 10)    Activity Tolerance:   Fair    After treatment:   ?         Patient left in no apparent distress sitting up in chair  ? Patient left in no apparent distress in bed  ? Call bell left within reach  ? Nursing notified  ? Caregiver present  ? Bed alarm activated  ? SCDs applied    COMMUNICATION/EDUCATION:   ?         Role of physical therapy and plan of care in the acute care setting. ?         Fall prevention education was provided and the patient/caregiver indicated understanding. ? Patient/family have participated as able in goal setting and plan of care. ?         Patient/family agree to work toward stated goals and plan of care. ?         Patient understands intent and goals of therapy, but is neutral about his/her participation. ?          Patient is unable to participate in goal setting/plan of care: ongoing with therapy staff.     Thank you for this referral.  Luigi Cruz, PT   Time Calculation: 8 mins    Eval Complexity: History: MEDIUM  Complexity : 1-2 comorbidities / personal factors will impact the outcome/ POC Exam:MEDIUM Complexity : 3 Standardized tests and measures addressing body structure, function, activity limitation and / or participation in recreation  Presentation: MEDIUM Complexity : Evolving with changing characteristics  Clinical Decision Making:Medium Complexity clinical judgement; ROM, MMT, functional mobility  Overall Complexity:MEDIUM

## 2019-10-14 NOTE — PROGRESS NOTES
Problem: Diabetes Self-Management  Goal: *Disease process and treatment process  Description  Define diabetes and identify own type of diabetes; list 3 options for treating diabetes. Outcome: Progressing Towards Goal     Problem: Diabetes Self-Management  Goal: *Incorporating nutritional management into lifestyle  Description  Describe effect of type, amount and timing of food on blood glucose; list 3 methods for planning meals. Outcome: Progressing Towards Goal     Problem: Diabetes Self-Management  Goal: *Developing strategies to promote health/change behavior  Description  Define the ABC's of diabetes; identify appropriate screenings, schedule and personal plan for screenings. Outcome: Progressing Towards Goal     Problem: Diabetes Self-Management  Goal: *Using medications safely  Description  State effect of diabetes medications on diabetes; name diabetes medication taking, action and side effects. Outcome: Progressing Towards Goal     Problem: Diabetes Self-Management  Goal: *Prevention, detection and treatment of chronic complications  Description  Define the natural course of diabetes and describe the relationship of blood glucose levels to long term complications of diabetes.   Outcome: Progressing Towards Goal     Problem: Pain  Goal: *Control of Pain  Outcome: Progressing Towards Goal     Problem: Chronic Obstructive Pulmonary Disease (COPD)  Goal: *Absence of hypoxia  Outcome: Progressing Towards Goal     Problem: Chronic Obstructive Pulmonary Disease (COPD)  Goal: *Optimize nutritional status  Outcome: Progressing Towards Goal

## 2019-10-14 NOTE — PROGRESS NOTES
1629: Patient found on RA. Patient preparing to walk unit with RN. SpO2 98%. RT placed nasal cannula at 2 lpm back on patient due to increased WOB while at rest on RA.

## 2019-10-14 NOTE — PROGRESS NOTES
Pulmonary Progress Note    History  63 y/o woman admitted to the hospital on 10/10/19 for an AECOPD with acute on chronic cough after failing outpatient therapy with Levaquin, Mucinex and oral steroids. Chest CT tracheobronchomalacia, groundglass infiltrates and emphysema. The patient's breathing and cough have much improved. She would like to continue all therapies at discharge including the antibiotic    Exam  Alert and oriented. No cough during my interview. No respiratory distress at rest.  Normal affect  Blood pressure 127/53, pulse 80, temperature 98.6 °F (37 °C), resp. rate 20, height 5' 7\" (1.702 m), weight 113.4 kg (250 lb), SpO2 99 %, not currently breastfeeding. No wheezing but breath sounds are diminished  RRR  Soft  No cyanosis, clubbing or edema  No gross motor deficit. Gaze conjugate    WBC 22.6   BUN 20 and Cr 0.82    Sputum growing Levaquin sensitive Klebsiella.   This is at odds with her clinical response here    Assessment  AECOPD failing outpatient therapies including Levaquin    Plan  Continue current therapies  Evaluate for possible home Cefepime for a total of 10 days of therapy

## 2019-10-14 NOTE — DIABETES MGMT
Glycemic Control Plan of Care    Follow up today for continued glycemic control - patient expressed frustration about high blood sugars despite \"all the shots I'm getting\".   Reviewed her recent BG and increased dose of lantus - explained splitting her long acting insulin to twice daily with the IV steroid use -       states she was only on oral agents at home and she is concerned about requiring insulin at discharge - reviewed her A1c and home DM medications - instructed her to monitor her BG more frequently when discharged, resume her home medications and f/u with PCP - will continue to follow       Ventura Yun MPH RN  Pager [phone]  Office [phone]

## 2019-10-14 NOTE — PROGRESS NOTES
Problem: Discharge Planning  Goal: *Discharge to safe environment  Outcome: Progressing Towards Goal     Home / [facility] COPD    Will need walk test to evaluate home oxygen needs prior to d/c

## 2019-10-14 NOTE — PROGRESS NOTES
Bedside shift change report given to ARACELI Escamilla (oncoming nurse) by Dinora Boudreaux RN (offgoing nurse). Report included the following information SBAR, Kardex, Intake/Output, MAR and Recent Results. A/O x4, no pain visualized, non-tele, IV flushed and capped, call bell and personal belongings in reach.      0931 -- AM medications given, well tolerated, will continue to monitor.      1152 -- Reassessment completed, no change in patient condition, will continue to monitor.      1546 -- Reassessment completed, no change in patient condition, will continue to monitor. 1653 -- Walk test completed.      Bedside shift change report given to  RN (oncoming nurse) by Charlie Jorge RN (offgoing nurse). Report included the following information SBAR, Kardex, Intake/Output, MAR and Recent Results.

## 2019-10-15 LAB
ANION GAP SERPL CALC-SCNC: 10 MMOL/L (ref 3–18)
ANION GAP SERPL CALC-SCNC: 8 MMOL/L (ref 3–18)
BASOPHILS # BLD: 0 K/UL (ref 0–0.1)
BASOPHILS NFR BLD: 0 % (ref 0–3)
BUN SERPL-MCNC: 24 MG/DL (ref 7–18)
BUN SERPL-MCNC: 28 MG/DL (ref 7–18)
BUN/CREAT SERPL: 24 (ref 12–20)
BUN/CREAT SERPL: 25 (ref 12–20)
CALCIUM SERPL-MCNC: 8.6 MG/DL (ref 8.5–10.1)
CALCIUM SERPL-MCNC: 9.1 MG/DL (ref 8.5–10.1)
CHLORIDE SERPL-SCNC: 95 MMOL/L (ref 100–111)
CHLORIDE SERPL-SCNC: 97 MMOL/L (ref 100–111)
CO2 SERPL-SCNC: 28 MMOL/L (ref 21–32)
CO2 SERPL-SCNC: 28 MMOL/L (ref 21–32)
CREAT SERPL-MCNC: 0.97 MG/DL (ref 0.6–1.3)
CREAT SERPL-MCNC: 1.18 MG/DL (ref 0.6–1.3)
DIFFERENTIAL METHOD BLD: ABNORMAL
EOSINOPHIL # BLD: 0 K/UL (ref 0–0.4)
EOSINOPHIL NFR BLD: 0 % (ref 0–5)
ERYTHROCYTE [DISTWIDTH] IN BLOOD BY AUTOMATED COUNT: 17.9 % (ref 11.6–14.5)
GLUCOSE BLD STRIP.AUTO-MCNC: 202 MG/DL (ref 70–110)
GLUCOSE BLD STRIP.AUTO-MCNC: 202 MG/DL (ref 70–110)
GLUCOSE BLD STRIP.AUTO-MCNC: 213 MG/DL (ref 70–110)
GLUCOSE BLD STRIP.AUTO-MCNC: 253 MG/DL (ref 70–110)
GLUCOSE BLD STRIP.AUTO-MCNC: 277 MG/DL (ref 70–110)
GLUCOSE BLD STRIP.AUTO-MCNC: 292 MG/DL (ref 70–110)
GLUCOSE BLD STRIP.AUTO-MCNC: 299 MG/DL (ref 70–110)
GLUCOSE BLD STRIP.AUTO-MCNC: 329 MG/DL (ref 70–110)
GLUCOSE BLD STRIP.AUTO-MCNC: 435 MG/DL (ref 70–110)
GLUCOSE BLD STRIP.AUTO-MCNC: 439 MG/DL (ref 70–110)
GLUCOSE BLD STRIP.AUTO-MCNC: 471 MG/DL (ref 70–110)
GLUCOSE SERPL-MCNC: 390 MG/DL (ref 74–99)
GLUCOSE SERPL-MCNC: 444 MG/DL (ref 74–99)
HCT VFR BLD AUTO: 29.9 % (ref 35–45)
HGB BLD-MCNC: 8.9 G/DL (ref 12–16)
LYMPHOCYTES # BLD: 5.1 K/UL (ref 0.8–3.5)
LYMPHOCYTES NFR BLD: 24 % (ref 20–51)
MAGNESIUM SERPL-MCNC: 2.2 MG/DL (ref 1.6–2.6)
MAGNESIUM SERPL-MCNC: 2.3 MG/DL (ref 1.6–2.6)
MAGNESIUM SERPL-MCNC: 2.5 MG/DL (ref 1.6–2.6)
MCH RBC QN AUTO: 21.5 PG (ref 24–34)
MCHC RBC AUTO-ENTMCNC: 29.8 G/DL (ref 31–37)
MCV RBC AUTO: 72.2 FL (ref 74–97)
METAMYELOCYTES NFR BLD MANUAL: 1 %
MONOCYTES # BLD: 0.4 K/UL (ref 0–1)
MONOCYTES NFR BLD: 2 % (ref 2–9)
NEUTS SEG # BLD: 15.4 K/UL (ref 1.8–8)
NEUTS SEG NFR BLD: 73 % (ref 42–75)
PHOSPHATE SERPL-MCNC: 3.5 MG/DL (ref 2.5–4.9)
PHOSPHATE SERPL-MCNC: 3.9 MG/DL (ref 2.5–4.9)
PLATELET # BLD AUTO: 357 K/UL (ref 135–420)
PLATELET COMMENTS,PCOM: ABNORMAL
PMV BLD AUTO: 10.3 FL (ref 9.2–11.8)
POTASSIUM SERPL-SCNC: 4.6 MMOL/L (ref 3.5–5.5)
POTASSIUM SERPL-SCNC: 5.2 MMOL/L (ref 3.5–5.5)
RBC # BLD AUTO: 4.14 M/UL (ref 4.2–5.3)
RBC MORPH BLD: ABNORMAL
SODIUM SERPL-SCNC: 133 MMOL/L (ref 136–145)
SODIUM SERPL-SCNC: 133 MMOL/L (ref 136–145)
WBC # BLD AUTO: 21.1 K/UL (ref 4.6–13.2)

## 2019-10-15 PROCEDURE — 85025 COMPLETE CBC W/AUTO DIFF WBC: CPT

## 2019-10-15 PROCEDURE — 83735 ASSAY OF MAGNESIUM: CPT

## 2019-10-15 PROCEDURE — 74011636637 HC RX REV CODE- 636/637: Performed by: HOSPITALIST

## 2019-10-15 PROCEDURE — 74011000258 HC RX REV CODE- 258: Performed by: INTERNAL MEDICINE

## 2019-10-15 PROCEDURE — 74011636637 HC RX REV CODE- 636/637: Performed by: INTERNAL MEDICINE

## 2019-10-15 PROCEDURE — 65270000029 HC RM PRIVATE

## 2019-10-15 PROCEDURE — 74011250637 HC RX REV CODE- 250/637: Performed by: INTERNAL MEDICINE

## 2019-10-15 PROCEDURE — 74011000250 HC RX REV CODE- 250: Performed by: INTERNAL MEDICINE

## 2019-10-15 PROCEDURE — 74011250636 HC RX REV CODE- 250/636: Performed by: HOSPITALIST

## 2019-10-15 PROCEDURE — 84100 ASSAY OF PHOSPHORUS: CPT

## 2019-10-15 PROCEDURE — 80048 BASIC METABOLIC PNL TOTAL CA: CPT

## 2019-10-15 PROCEDURE — 94640 AIRWAY INHALATION TREATMENT: CPT

## 2019-10-15 PROCEDURE — 94667 MNPJ CHEST WALL 1ST: CPT

## 2019-10-15 PROCEDURE — 74011250636 HC RX REV CODE- 250/636: Performed by: INTERNAL MEDICINE

## 2019-10-15 PROCEDURE — 94761 N-INVAS EAR/PLS OXIMETRY MLT: CPT

## 2019-10-15 PROCEDURE — 82962 GLUCOSE BLOOD TEST: CPT

## 2019-10-15 PROCEDURE — 36415 COLL VENOUS BLD VENIPUNCTURE: CPT

## 2019-10-15 PROCEDURE — 74011250637 HC RX REV CODE- 250/637: Performed by: HOSPITALIST

## 2019-10-15 PROCEDURE — 94668 MNPJ CHEST WALL SBSQ: CPT

## 2019-10-15 RX ORDER — INSULIN LISPRO 100 [IU]/ML
15 INJECTION, SOLUTION INTRAVENOUS; SUBCUTANEOUS
Status: DISCONTINUED | OUTPATIENT
Start: 2019-10-15 | End: 2019-10-15 | Stop reason: ALTCHOICE

## 2019-10-15 RX ORDER — INSULIN GLARGINE 100 [IU]/ML
10 INJECTION, SOLUTION SUBCUTANEOUS ONCE
Status: COMPLETED | OUTPATIENT
Start: 2019-10-15 | End: 2019-10-15

## 2019-10-15 RX ORDER — DEXTROSE MONOHYDRATE 100 MG/ML
125-250 INJECTION, SOLUTION INTRAVENOUS AS NEEDED
Status: DISCONTINUED | OUTPATIENT
Start: 2019-10-15 | End: 2019-10-17 | Stop reason: HOSPADM

## 2019-10-15 RX ORDER — CODEINE PHOSPHATE AND GUAIFENESIN 10; 100 MG/5ML; MG/5ML
10 SOLUTION ORAL
Status: DISCONTINUED | OUTPATIENT
Start: 2019-10-15 | End: 2019-10-17 | Stop reason: HOSPADM

## 2019-10-15 RX ORDER — INSULIN GLARGINE 100 [IU]/ML
52 INJECTION, SOLUTION SUBCUTANEOUS DAILY
Status: DISCONTINUED | OUTPATIENT
Start: 2019-10-16 | End: 2019-10-15

## 2019-10-15 RX ORDER — MAGNESIUM SULFATE 100 %
4 CRYSTALS MISCELLANEOUS AS NEEDED
Status: DISCONTINUED | OUTPATIENT
Start: 2019-10-15 | End: 2019-10-15 | Stop reason: SDUPTHER

## 2019-10-15 RX ADMIN — GABAPENTIN 600 MG: 300 CAPSULE ORAL at 08:26

## 2019-10-15 RX ADMIN — HYDROCODONE POLISTIREX AND CHLORPHENIRAMINE POLISTIREX 5 ML: 10; 8 SUSPENSION, EXTENDED RELEASE ORAL at 11:08

## 2019-10-15 RX ADMIN — QUETIAPINE FUMARATE 300 MG: 300 TABLET ORAL at 21:33

## 2019-10-15 RX ADMIN — AMLODIPINE BESYLATE 10 MG: 5 TABLET ORAL at 08:30

## 2019-10-15 RX ADMIN — OXYCODONE HYDROCHLORIDE 10 MG: 5 TABLET ORAL at 20:06

## 2019-10-15 RX ADMIN — SERTRALINE HYDROCHLORIDE 100 MG: 50 TABLET ORAL at 08:27

## 2019-10-15 RX ADMIN — INSULIN LISPRO 15 UNITS: 100 INJECTION, SOLUTION INTRAVENOUS; SUBCUTANEOUS at 08:34

## 2019-10-15 RX ADMIN — GUAIFENESIN 1200 MG: 600 TABLET, EXTENDED RELEASE ORAL at 20:06

## 2019-10-15 RX ADMIN — PANTOPRAZOLE SODIUM 20 MG: 20 TABLET, DELAYED RELEASE ORAL at 07:18

## 2019-10-15 RX ADMIN — IPRATROPIUM BROMIDE AND ALBUTEROL SULFATE 3 ML: .5; 3 SOLUTION RESPIRATORY (INHALATION) at 13:03

## 2019-10-15 RX ADMIN — IPRATROPIUM BROMIDE AND ALBUTEROL SULFATE 3 ML: .5; 3 SOLUTION RESPIRATORY (INHALATION) at 08:06

## 2019-10-15 RX ADMIN — HEPARIN SODIUM 5000 UNITS: 5000 INJECTION INTRAVENOUS; SUBCUTANEOUS at 21:29

## 2019-10-15 RX ADMIN — GUAIFENESIN AND CODEINE PHOSPHATE 10 ML: 100; 10 SOLUTION ORAL at 16:57

## 2019-10-15 RX ADMIN — GUAIFENESIN 1200 MG: 600 TABLET, EXTENDED RELEASE ORAL at 08:24

## 2019-10-15 RX ADMIN — CLONAZEPAM 1 MG: 0.5 TABLET ORAL at 16:57

## 2019-10-15 RX ADMIN — INSULIN LISPRO 15 UNITS: 100 INJECTION, SOLUTION INTRAVENOUS; SUBCUTANEOUS at 12:18

## 2019-10-15 RX ADMIN — IPRATROPIUM BROMIDE AND ALBUTEROL SULFATE 3 ML: .5; 3 SOLUTION RESPIRATORY (INHALATION) at 16:02

## 2019-10-15 RX ADMIN — IPRATROPIUM BROMIDE AND ALBUTEROL SULFATE 3 ML: .5; 3 SOLUTION RESPIRATORY (INHALATION) at 20:48

## 2019-10-15 RX ADMIN — GABAPENTIN 600 MG: 300 CAPSULE ORAL at 21:33

## 2019-10-15 RX ADMIN — HEPARIN SODIUM 5000 UNITS: 5000 INJECTION INTRAVENOUS; SUBCUTANEOUS at 16:55

## 2019-10-15 RX ADMIN — METHYLPREDNISOLONE SODIUM SUCCINATE 60 MG: 125 INJECTION, POWDER, FOR SOLUTION INTRAMUSCULAR; INTRAVENOUS at 07:18

## 2019-10-15 RX ADMIN — GABAPENTIN 600 MG: 300 CAPSULE ORAL at 16:57

## 2019-10-15 RX ADMIN — MONTELUKAST 10 MG: 10 TABLET, FILM COATED ORAL at 21:29

## 2019-10-15 RX ADMIN — HYDROCODONE POLISTIREX AND CHLORPHENIRAMINE POLISTIREX 5 ML: 10; 8 SUSPENSION, EXTENDED RELEASE ORAL at 23:22

## 2019-10-15 RX ADMIN — METHYLPREDNISOLONE SODIUM SUCCINATE 60 MG: 125 INJECTION, POWDER, FOR SOLUTION INTRAMUSCULAR; INTRAVENOUS at 16:55

## 2019-10-15 RX ADMIN — HEPARIN SODIUM 5000 UNITS: 5000 INJECTION INTRAVENOUS; SUBCUTANEOUS at 07:18

## 2019-10-15 RX ADMIN — PREDNISONE 60 MG: 10 TABLET ORAL at 18:01

## 2019-10-15 RX ADMIN — ARFORMOTEROL TARTRATE 15 MCG: 15 SOLUTION RESPIRATORY (INHALATION) at 08:06

## 2019-10-15 RX ADMIN — CEFEPIME HYDROCHLORIDE 1 G: 1 INJECTION, POWDER, FOR SOLUTION INTRAMUSCULAR; INTRAVENOUS at 07:18

## 2019-10-15 RX ADMIN — INSULIN GLARGINE 45 UNITS: 100 INJECTION, SOLUTION SUBCUTANEOUS at 08:32

## 2019-10-15 RX ADMIN — OXYCODONE HYDROCHLORIDE 10 MG: 5 TABLET ORAL at 08:29

## 2019-10-15 RX ADMIN — SERTRALINE HYDROCHLORIDE 150 MG: 50 TABLET ORAL at 21:29

## 2019-10-15 RX ADMIN — CEFEPIME HYDROCHLORIDE 1 G: 1 INJECTION, POWDER, FOR SOLUTION INTRAMUSCULAR; INTRAVENOUS at 21:28

## 2019-10-15 RX ADMIN — PANTOPRAZOLE SODIUM 20 MG: 20 TABLET, DELAYED RELEASE ORAL at 21:29

## 2019-10-15 RX ADMIN — INSULIN LISPRO 15 UNITS: 100 INJECTION, SOLUTION INTRAVENOUS; SUBCUTANEOUS at 12:17

## 2019-10-15 RX ADMIN — CETIRIZINE HYDROCHLORIDE 10 MG: 10 TABLET, FILM COATED ORAL at 08:27

## 2019-10-15 RX ADMIN — INSULIN GLARGINE 10 UNITS: 100 INJECTION, SOLUTION SUBCUTANEOUS at 10:15

## 2019-10-15 RX ADMIN — SODIUM CHLORIDE 7.5 UNITS/HR: 9 INJECTION, SOLUTION INTRAVENOUS at 14:51

## 2019-10-15 RX ADMIN — ARFORMOTEROL TARTRATE 15 MCG: 15 SOLUTION RESPIRATORY (INHALATION) at 20:48

## 2019-10-15 RX ADMIN — METHYLPREDNISOLONE SODIUM SUCCINATE 60 MG: 125 INJECTION, POWDER, FOR SOLUTION INTRAMUSCULAR; INTRAVENOUS at 00:54

## 2019-10-15 RX ADMIN — OXYCODONE HYDROCHLORIDE 10 MG: 5 TABLET ORAL at 00:54

## 2019-10-15 NOTE — PROGRESS NOTES
Problem: Discharge Planning  Goal: *Discharge to safe environment  Outcome: Progressing Towards Goal     Home     Chart reviewed. Patient states she does not need [facility] home health visit. Is interested in getting more information about pulmonary rehab. Darwin López.  BRANDON Patino  [facility]  [phone], Pager [phone]  [email or web address]

## 2019-10-15 NOTE — PROGRESS NOTES
Problem: Chronic Obstructive Pulmonary Disease (COPD)  Goal: *Oxygen saturation during activity within specified parameters  Outcome: Progressing Towards Goal     Problem: Chronic Obstructive Pulmonary Disease (COPD)  Goal: *Absence of hypoxia  Outcome: Progressing Towards Goal   Respiratory Therapy Assessment Care Plan    Patient:  Lisa Vargas 64 y.o. female 10/15/2019 2:30 PM    COPD exacerbation ([address] [J44.1]  Asthma exacerbation [J45.901]      Chest X-RAY:   Results from Hospital Encounter encounter on 10/08/19   XR CHEST PA LAT    Impression IMPRESSION:    No significant interval change or acute pulmonary process identified. Results from [place] encounter on 05/31/16   XR KNEE RT MAX 2 VWS    Impression IMPRESSION:    Right total knee arthroplasty in good anatomic alignment. Results from [place] encounter on 05/26/13   XR ABD FLAT/ ERECT    Impression IMPRESSION: No acute findings in the chest or abdomen                  Vital Signs:   Visit Vitals  /54   Pulse 77   Temp 98.2 °F (36.8 °C)   Resp 16   Ht 5' 7\" (1.702 m)   Wt 113.4 kg (250 lb)   SpO2 98%   Breastfeeding?  No   BMI 39.16 kg/m²

## 2019-10-15 NOTE — PROGRESS NOTES
Attempted PT, pt refused to participate until she has had coffee. Will continue to follow.  Tyesha Cornell, PTA

## 2019-10-15 NOTE — PROGRESS NOTES
Pulmonary Progress Note    History  63 y/o woman admitted to the hospital on 10/10/19 for an AECOPD with acute on chronic cough after failing outpatient therapy with Levaquin, Mucinex and oral steroids. Chest CT tracheobronchomalacia, groundglass infiltrates and emphysema. The patient's breathing and cough remain improved. Glucose poorly controlled. Has been on steroids for most of the past 3 weeks    Exam  Alert and oriented. No cough during my interview except when laughing. No respiratory distress at rest.  Normal affect  Blood pressure 120/54, pulse 77, temperature 98.2 °F (36.8 °C), resp. rate 16, height 5' 7\" (1.702 m), weight 113.4 kg (250 lb), SpO2 99 %, not currently breastfeeding. No wheezing  RRR  Soft  No cyanosis, clubbing or edema  No gross motor deficit. Gaze conjugate    WBC 21.1   BUN 28 and Cr 1.18    Sputum growing Levaquin sensitive Klebsiella. This is at odds with her clinical response here    Assessment  AECOPD failing outpatient therapies including Levaquin much improved with current therapies  DMII with poor control secondary to steroids    Plan  Transition to prednisone. 60 mg tonight then every morning until discharge. Then taper over next 10 days. Continue neb therapies at discharge for the next 1-2 weeks before resuming typical outpatient therapy.   Encourage acapella for secretion clearance prn  Evaluate for possible home Cefepime for a total of 10 days of therapy

## 2019-10-15 NOTE — PROGRESS NOTES
PREOPERATIVE DIAGNOSIS:  Left ring finger trigger finger.     POSTOPERATIVE DIAGNOSIS:  same.    PROCEDURE PERFORMED:  A1 pulley release flexor tendon.    SURGEON:  Sindy Galicia DO.    ASSISTANT:  HUMBERTO Cook.    ANESTHESIA:   Lidocaine 1% 1:100,000 epinephrine, 5 mL.    COMPLICATIONS:  None.    SPECIMENS:  None.    FLUIDS:  Lactated ringers.    The patient tolerated the procedure well.  Was transferred to the postanesthesia care unit in satisfactory condition. Operative findings consistent with trigger finger, uncomplicated release of A1 pulley.    BRIEF OPERATIVE INDICATIONS.  The patient has failed conservative therapy, including injections and elects to undergo an A-1 pulley release.  The patient is aware of all the risks and benefits, including neurovascular injury, need for further surgery and continued triggering pain in the palm and soft tissue nonhealing.  The patient elected to undergo the above procedure.    OPERATIVE COURSE:  The patient was identified in the holding area.  Correct extremity was marked with initials.  Prior to prepping the affected finger was injected with 5 mL of 1% lidocaine with epinephrine.  This injection was allowed to sit for 30 minutes.  The patient was transferred back to the operative suite.  The patient was placed on the operative table.  Upper extremity was prepped and draped in normal sterile fashion.  A skin marker was used to orestes an incision in line with the affected finger.  An incision was made down through the skin and subcutaneous tissue.  A tenotomy scissors was used to bluntly spread the underlying palmar fascia and fat at the base of the finger.  Ragnell retractors were then used to expose the A-1 pulley.  A 15 blade scalpel and was used to incise the A-1 pulley in line with the tendons.  Care was kept not to cut into the tendons themselves.  The tenotomy scissors were then used to release up to the A2 pulley and the remaining proximal portion of the  1917: Assumed patient care. Received patient resting in bed, patient is alert and oriented x 4. On 3 Lpm via NC. No signs of distress. Call bell within reach. With visitors at bedside. 2255: Patient verbalized that the percocet she's getting is not helping with her pain and it is messing with her stomach because it has acetaminophen, patient stated she takes roxicodone 30 mg every 4 hrs as needed for pain at home, informed patient that I have to call the doctor and ask if she can have roxicodone, patient verbalized understanding. 2301: Paged Dr. Ruddy Martin.    [id number]: received a call back from Dr. Ruddy Martin, notified him of the above. Received an order for a one time dose of roxicodone 30 mg PO. RBV provided. 2329: roxicodone 30 mg PO given for pain level of 10/10.    10-11-19    0029: patient stated her pain is better. Rated pain as 7/10 and that her pain is tolerable. 0700: received a call from the lab, notified by Sahra Duarte that pt has a blood glucose of 490. Verbal read back provided. Paged Dr. Ruddy Martin.    4063: received a call back from Dr. Ruddy Martin, notified him that pt's blood glucose is 490, and pt is on the very resistant insulin sliding scale and lantus 20 units daily, no new orders received. 5105: Bedside and Verbal shift change report given to Rosmery Cruz (oncoming nurse) by Valentín Patel   (offgoing nurse). Report included the following information SBAR, Intake/Output, MAR and Recent Results. A1 pulley.  The tendon was then brought up out of the wound for inspection of any areas of abnormalities.  The wound was copiously irrigated.  The patient was instructed to make a full fist.  There was no triggering present and full range of motion of the finger.  The wound was then copiously irrigated again and closed with 4-0 nylon sutures, 4-0 nylon sutures in a horizontal mattress fashion.  A 2 inch Ace wrap and 4 x 4s, along with some Xeroform were used to dress the wound, and the patient was transferred to the postanesthesia care unit in satisfactory condition.    POSTOPERATIVE COURSE:  We will see the patient back in the office in approximately 2 weeks.  The patient is allowed to remove the dressing in 3 days and shower.  The patient will place a Band-Aid over it at that time.  Pain medication is given.

## 2019-10-15 NOTE — PROGRESS NOTES
Problem: Falls - Risk of  Goal: *Absence of Falls  Description  Document [facility] Fall Risk and appropriate interventions in the flowsheet.   Outcome: Progressing Towards Goal  Note:   Fall Risk Interventions:            Medication Interventions: Patient to call before getting OOB, Teach patient to arise slowly    Elimination Interventions: Call light in reach, Patient to call for help with toileting needs              Problem: Patient Education: Go to Patient Education Activity  Goal: Patient/Family Education  Outcome: Progressing Towards Goal

## 2019-10-15 NOTE — PROGRESS NOTES
Bedside shift change report given to ARACELI Escamilla (oncoming nurse) by Nico Grimaldo RN (offgoing nurse). Report included the following information SBAR, Kardex, Intake/Output, MAR and Recent Results.      0830 -- AM medications given, well tolerated, will continue to monitor.      1134 -- Reassessment completed, no change in patient condition, will continue to monitor. 1435 -- Blood draw. 1445 -- Paged Al[facility] to check diet orders, patient still has a diet, but insulin drip is to be started @ 1500.      1451 -- Reassessment completed, no change in patient condition, will continue to monitor. , Insulin drip started @ 7.5 units, verified with Gorge Faust, [address] [facility] -- Critical lab , Dr. Nik Chaney not notified, patient just started on insulin drip @ 1451.    1553 -- , Insulin drip rate change 2.3 units. 1652 -- , Insulin drip rate change 3.9 units. 1755 -- , Insulin drip rate change 8.1 units. 1857 -- , Insulin drip rate change 9.6 units.      Bedside shift change report given to ARACELI Keys (oncoming nurse) by Ferdinand Brar RN (offgoing nurse). Report included the following information SBAR, Kardex, Intake/Output, MAR and Recent Results.

## 2019-10-15 NOTE — PROGRESS NOTES
Problem: Diabetes Self-Management  Goal: *Disease process and treatment process  Description  Define diabetes and identify own type of diabetes; list 3 options for treating diabetes. Outcome: Progressing Towards Goal  Goal: *Incorporating nutritional management into lifestyle  Description  Describe effect of type, amount and timing of food on blood glucose; list 3 methods for planning meals. Outcome: Progressing Towards Goal  Goal: *Incorporating physical activity into lifestyle  Description  State effect of exercise on blood glucose levels. Outcome: Progressing Towards Goal  Goal: *Developing strategies to promote health/change behavior  Description  Define the ABC's of diabetes; identify appropriate screenings, schedule and personal plan for screenings. Outcome: Progressing Towards Goal  Goal: *Using medications safely  Description  State effect of diabetes medications on diabetes; name diabetes medication taking, action and side effects. Outcome: Progressing Towards Goal  Goal: *Monitoring blood glucose, interpreting and using results  Description  Identify recommended blood glucose targets  and personal targets. Outcome: Progressing Towards Goal  Goal: *Prevention, detection, treatment of acute complications  Description  List symptoms of hyper- and hypoglycemia; describe how to treat low blood sugar and actions for lowering  high blood glucose level. Outcome: Progressing Towards Goal  Goal: *Prevention, detection and treatment of chronic complications  Description  Define the natural course of diabetes and describe the relationship of blood glucose levels to long term complications of diabetes.   Outcome: Progressing Towards Goal  Goal: *Developing strategies to address psychosocial issues  Description  Describe feelings about living with diabetes; identify support needed and support network  Outcome: Progressing Towards Goal  Goal: *Sick day guidelines  Outcome: Progressing Towards Goal  Goal: *Patient Specific Goal (EDIT GOAL, INSERT TEXT)  Outcome: Progressing Towards Goal     Problem: Patient Education: Go to Patient Education Activity  Goal: Patient/Family Education  Outcome: Progressing Towards Goal     Problem: Pain  Goal: *Control of Pain  Outcome: Progressing Towards Goal     Problem: Patient Education: Go to Patient Education Activity  Goal: Patient/Family Education  Outcome: Progressing Towards Goal     Problem: Chronic Obstructive Pulmonary Disease (COPD)  Goal: *Oxygen saturation during activity within specified parameters  Outcome: Progressing Towards Goal  Goal: *Able to remain out of bed as prescribed  Outcome: Progressing Towards Goal  Goal: *Absence of hypoxia  Outcome: Progressing Towards Goal  Goal: *Optimize nutritional status  Outcome: Progressing Towards Goal     Problem: Patient Education: Go to Patient Education Activity  Goal: Patient/Family Education  Outcome: Progressing Towards Goal     Problem: Falls - Risk of  Goal: *Absence of Falls  Description  Document Beau Dawkins Fall Risk and appropriate interventions in the flowsheet.   Outcome: Progressing Towards Goal  Note:   Fall Risk Interventions:            Medication Interventions: Patient to call before getting OOB    Elimination Interventions: Call light in reach              Problem: Patient Education: Go to Patient Education Activity  Goal: Patient/Family Education  Outcome: Progressing Towards Goal     Problem: Discharge Planning  Goal: *Discharge to safe environment  Outcome: Progressing Towards Goal     Problem: Diabetes Maintenance:Ongoing  Goal: *Blood Glucose 80 to 180 md/dl  Description  Insulin management with steroids    Outcome: Progressing Towards Goal     Problem: Altered nutrition  Goal: *Optimize nutritional status  Outcome: Progressing Towards Goal     Problem: Patient Education: Go to Patient Education Activity  Goal: Patient/Family Education  Outcome: Progressing Towards Goal

## 2019-10-15 NOTE — PROGRESS NOTES
Internal Medicine Progress Note        NAME: Cameron Solares   :  1960  MRM:  [id number]    Date/Time: 10/15/2019        ASSESSMENT/PLAN:     # Acute respiratory failure with hypoxemia. Not on home O2, will verify prior DC. Maintain O2. PCCM on board. # Acute COPD exacerbation with Acute Bronchitis   Failed levaquin course . Report she responded to change Abx, changed by PCCM to cefepime. Treated with   steroids, nebulized bronchodilators, antibiotics. Switch atbx to cefepime per Pulm; day 4/5. Start chest PT  Check sputum culture and urine antigens, culture pending, shows few  Jacobstad  . Start robitussin with codeine d/t severe frequent cough  CTA chest is neg for PE but does show tracheomalacia and alveolitis. Consulted Pulm, appreciate recs.         # Diabetes mellitus, type 2 . Provide SSI, hypoglycemia protocol and frequent Accu checks. Provide SSI, hypoglycemia protocol and frequent Accu checks. Education,  diabetic educator   Hyperglycemia due to steroids. Increased lantus. -BSL up, will consider insulin ggt if no improvement. # Obesity. Body mass index is 39.16 kg/m². # Quit smoking about 3 months ago, slipped at times. Counseled to complete quit      # Deconditioned .  PT.     -Code status : FULL     Lab Review:     Recent Labs     10/15/19  0525 10/14/19  0315 10/13/19  0448   WBC 21.1* 22.6* 21.9*   HGB 8.9* 8.6* 8.6*   HCT 29.9* 28.9* 28.7*    371 361     Recent Labs     10/15/19  0525 10/14/19  0315 10/13/19  0448   * 135* 136   K 5.2 4.6 4.8   CL 97* 99* 99*   CO2 28 31 29   * 274* 194*   BUN 24* 20* 22*   CREA 0.97 0.82 0.79   CA 8.6 8.6 8.7   MG 2.3 2.5 2.4   PHOS 3.9 3.6 3.8     Lab Results   Component Value Date/Time    Glucose (POC) 471 (HH) 10/15/2019 11:28 AM    Glucose (POC) 439 (HH) 10/15/2019 08:01 AM    Glucose (POC) 358 (H) 10/14/2019 08:41 PM    Glucose (POC) 345 (H) 10/14/2019 04:55 PM    Glucose (POC) 347 (H) 10/14/2019 11:53 AM    Glucose,  (H) 08/19/2010 02:20 AM     No results for input(s): PH, PCO2, PO2, HCO3, FIO2 in the last 72 hours. No results for input(s): INR, INREXT, INREXT in the last 72 hours. Lab Results   Component Value Date/Time    Specimen Description: BLOOD 01/09/2011 01:45 AM    Specimen Description: BLOOD 01/09/2011 01:30 AM    Specimen Description: SPUTUM 08/19/2010 01:00 PM     Lab Results   Component Value Date/Time    Culture result: FEW KLEBSIELLA PNEUMONIAE (A) 10/09/2019 10:44 AM    Culture result: MODERATE NORMAL RESPIRATORY STEPHON 10/09/2019 10:44 AM    Culture result:  05/12/2016 12:03 PM     MRSA target DNA is not detected (presumptive not colonized with MRSA)       All Cardiac Markers in the last 24 hours: No results found for: CPK, CK, CKMMB, CKMB, RCK3, CKMBT, CKNDX, CKND1, HUBER, TROPT, TROIQ, KAL, TROPT, TNIPOC, BNP, BNPP           Subjective:     Chief Complaint:      SOB for weeks, now better    ROS:  (bold if positive,otherwise negative)    Fever/chills ,  Dysuria   Cough ,   SOB/LYNCH , Chest Pain     Diarrhea ,Nausea/Vomit , Abd Pain , Constipation     Tolerating Diet                Objective:     Vitals:  Last 24hrs VS reviewed since prior progress note. Most recent are:    Visit Vitals  /54   Pulse 77   Temp 98.2 °F (36.8 °C)   Resp 16   Ht 5' 7\" (1.702 m)   Wt 113.4 kg (250 lb)   SpO2 98%   Breastfeeding? No   BMI 39.16 kg/m²     SpO2 Readings from Last 6 Encounters:   10/15/19 98%   09/24/19 98%   12/21/18 93%   11/16/18 97%   10/12/18 96%   06/02/16 97%    O2 Flow Rate (L/min): 2 l/min       Intake/Output Summary (Last 24 hours) at 10/15/2019 1313  Last data filed at 10/15/2019 0013  Gross per 24 hour   Intake 780 ml   Output 200 ml   Net 580 ml          Physical Exam:   Ears: hearing is intact  Eyes:  Icterus is not present  Lungs: less wheezes bilaterally,   Heart: regular rate and rhythm, S1, S2 normal, no murmur, click, rub or gallop  Gastrointestinal: soft, non-tender.  Bowel sounds normal. No masses,  no organomegaly  Neurological:  New Focal Deficits are not present  Psychiatric:  Mood is stable    Medications Reviewed: (see below)    Lab Data Reviewed: (see below)    ______________________________________________________________________    Medications:     Current Facility-Administered Medications   Medication Dose Route Frequency    [START ON 10/16/2019] insulin glargine (LANTUS) injection 52 Units  52 Units SubCUTAneous DAILY    insulin lispro (HUMALOG) injection 15 Units  15 Units SubCUTAneous TIDPC    oxyCODONE IR (ROXICODONE) tablet 10 mg  10 mg Oral Q6H PRN    arformoterol (BROVANA) neb solution 15 mcg  15 mcg Nebulization BID RT    albuterol-ipratropium (DUO-NEB) 2.5 MG-0.5 MG/3 ML  3 mL Nebulization QID RT    cefepime (MAXIPIME) 1 g in 0.9% sodium chloride (MBP/ADV) 50 mL MBP  1 g IntraVENous Q12H    chlorpheniramine-HYDROcodone (TUSSIONEX) oral suspension 5 mL  5 mL Oral Q12H PRN    pantoprazole (PROTONIX) tablet 20 mg  20 mg Oral ACB/HS    guaiFENesin ER (MUCINEX) tablet 1,200 mg  1,200 mg Oral Q12H    albuterol (PROVENTIL VENTOLIN) nebulizer solution 2.5 mg  2.5 mg Nebulization Q1H PRN    naloxone (NARCAN) injection 0.4 mg  0.4 mg IntraVENous PRN    insulin lispro (HUMALOG) injection   SubCUTAneous QID    cyclobenzaprine (FLEXERIL) tablet 10 mg  10 mg Oral Q12H PRN    cetirizine (ZYRTEC) tablet 10 mg  10 mg Oral DAILY    hydrOXYzine HCl (ATARAX) tablet 25 mg  25 mg Oral Q12H PRN    amLODIPine (NORVASC) tablet 10 mg  10 mg Oral DAILY    clonazePAM (KlonoPIN) tablet 1 mg  1 mg Oral TID PRN    gabapentin (NEURONTIN) capsule 600 mg  600 mg Oral TID    montelukast (SINGULAIR) tablet 10 mg  10 mg Oral QHS    QUEtiapine (SEROquel) tablet 300 mg  300 mg Oral QHS    sertraline (ZOLOFT) tablet 150 mg  150 mg Oral QHS    acetaminophen (TYLENOL) tablet 650 mg  650 mg Oral Q4H PRN    heparin (porcine) injection 5,000 Units  5,000 Units SubCUTAneous Q8H    glucose chewable tablet 16 g  4 Tab Oral PRN    glucagon (GLUCAGEN) injection 1 mg  1 mg IntraMUSCular PRN    methylPREDNISolone (PF) (Solu-MEDROL) injection 60 mg  60 mg IntraVENous Q8H    dextrose 10 % infusion 125-250 mL  125-250 mL IntraVENous PRN    sertraline (ZOLOFT) tablet 100 mg  100 mg Oral DAILY          Total time spent with patient: 35 minutes                  Care Plan discussed with: Patient, Care Manager, Nursing Staff and >50% of time spent in counseling and coordination of care    Discussed:  Care Plan    Prophylaxis:  Hep SQ    Disposition:  Home w/Family             Attending Physician: Ariel Leonard MD

## 2019-10-16 LAB
ANION GAP SERPL CALC-SCNC: 10 MMOL/L (ref 3–18)
ANION GAP SERPL CALC-SCNC: 10 MMOL/L (ref 3–18)
BASOPHILS # BLD: 0 K/UL (ref 0–0.1)
BASOPHILS NFR BLD: 0 % (ref 0–3)
BUN SERPL-MCNC: 27 MG/DL (ref 7–18)
BUN SERPL-MCNC: 27 MG/DL (ref 7–18)
BUN/CREAT SERPL: 26 (ref 12–20)
BUN/CREAT SERPL: 29 (ref 12–20)
CALCIUM SERPL-MCNC: 9.2 MG/DL (ref 8.5–10.1)
CALCIUM SERPL-MCNC: 9.2 MG/DL (ref 8.5–10.1)
CHLORIDE SERPL-SCNC: 96 MMOL/L (ref 100–111)
CHLORIDE SERPL-SCNC: 98 MMOL/L (ref 100–111)
CO2 SERPL-SCNC: 29 MMOL/L (ref 21–32)
CO2 SERPL-SCNC: 30 MMOL/L (ref 21–32)
CREAT SERPL-MCNC: 0.93 MG/DL (ref 0.6–1.3)
CREAT SERPL-MCNC: 1.04 MG/DL (ref 0.6–1.3)
DIFFERENTIAL METHOD BLD: ABNORMAL
EOSINOPHIL # BLD: 0 K/UL (ref 0–0.4)
EOSINOPHIL NFR BLD: 0 % (ref 0–5)
ERYTHROCYTE [DISTWIDTH] IN BLOOD BY AUTOMATED COUNT: 17.7 % (ref 11.6–14.5)
GLUCOSE BLD STRIP.AUTO-MCNC: 108 MG/DL (ref 70–110)
GLUCOSE BLD STRIP.AUTO-MCNC: 116 MG/DL (ref 70–110)
GLUCOSE BLD STRIP.AUTO-MCNC: 117 MG/DL (ref 70–110)
GLUCOSE BLD STRIP.AUTO-MCNC: 122 MG/DL (ref 70–110)
GLUCOSE BLD STRIP.AUTO-MCNC: 123 MG/DL (ref 70–110)
GLUCOSE BLD STRIP.AUTO-MCNC: 138 MG/DL (ref 70–110)
GLUCOSE BLD STRIP.AUTO-MCNC: 138 MG/DL (ref 70–110)
GLUCOSE BLD STRIP.AUTO-MCNC: 141 MG/DL (ref 70–110)
GLUCOSE BLD STRIP.AUTO-MCNC: 151 MG/DL (ref 70–110)
GLUCOSE BLD STRIP.AUTO-MCNC: 152 MG/DL (ref 70–110)
GLUCOSE BLD STRIP.AUTO-MCNC: 156 MG/DL (ref 70–110)
GLUCOSE BLD STRIP.AUTO-MCNC: 160 MG/DL (ref 70–110)
GLUCOSE BLD STRIP.AUTO-MCNC: 170 MG/DL (ref 70–110)
GLUCOSE BLD STRIP.AUTO-MCNC: 183 MG/DL (ref 70–110)
GLUCOSE BLD STRIP.AUTO-MCNC: 190 MG/DL (ref 70–110)
GLUCOSE BLD STRIP.AUTO-MCNC: 213 MG/DL (ref 70–110)
GLUCOSE BLD STRIP.AUTO-MCNC: 229 MG/DL (ref 70–110)
GLUCOSE BLD STRIP.AUTO-MCNC: 232 MG/DL (ref 70–110)
GLUCOSE BLD STRIP.AUTO-MCNC: 247 MG/DL (ref 70–110)
GLUCOSE BLD STRIP.AUTO-MCNC: 256 MG/DL (ref 70–110)
GLUCOSE BLD STRIP.AUTO-MCNC: 286 MG/DL (ref 70–110)
GLUCOSE BLD STRIP.AUTO-MCNC: 304 MG/DL (ref 70–110)
GLUCOSE SERPL-MCNC: 117 MG/DL (ref 74–99)
GLUCOSE SERPL-MCNC: 213 MG/DL (ref 74–99)
HCT VFR BLD AUTO: 29.5 % (ref 35–45)
HGB BLD-MCNC: 8.9 G/DL (ref 12–16)
LYMPHOCYTES # BLD: 3.4 K/UL (ref 0.8–3.5)
LYMPHOCYTES NFR BLD: 15 % (ref 20–51)
MAGNESIUM SERPL-MCNC: 2.3 MG/DL (ref 1.6–2.6)
MAGNESIUM SERPL-MCNC: 2.4 MG/DL (ref 1.6–2.6)
MAGNESIUM SERPL-MCNC: 2.5 MG/DL (ref 1.6–2.6)
MAGNESIUM SERPL-MCNC: 2.5 MG/DL (ref 1.6–2.6)
MCH RBC QN AUTO: 21.6 PG (ref 24–34)
MCHC RBC AUTO-ENTMCNC: 30.2 G/DL (ref 31–37)
MCV RBC AUTO: 71.6 FL (ref 74–97)
MONOCYTES # BLD: 1.6 K/UL (ref 0–1)
MONOCYTES NFR BLD: 7 % (ref 2–9)
NEUTS BAND NFR BLD MANUAL: 1 % (ref 0–5)
NEUTS SEG # BLD: 17.5 K/UL (ref 1.8–8)
NEUTS SEG NFR BLD: 77 % (ref 42–75)
PHOSPHATE SERPL-MCNC: 3.9 MG/DL (ref 2.5–4.9)
PLATELET # BLD AUTO: 374 K/UL (ref 135–420)
PLATELET COMMENTS,PCOM: ABNORMAL
PMV BLD AUTO: 9.7 FL (ref 9.2–11.8)
POTASSIUM SERPL-SCNC: 4.4 MMOL/L (ref 3.5–5.5)
POTASSIUM SERPL-SCNC: 4.6 MMOL/L (ref 3.5–5.5)
RBC # BLD AUTO: 4.12 M/UL (ref 4.2–5.3)
RBC MORPH BLD: ABNORMAL
SODIUM SERPL-SCNC: 136 MMOL/L (ref 136–145)
SODIUM SERPL-SCNC: 137 MMOL/L (ref 136–145)
WBC # BLD AUTO: 22.7 K/UL (ref 4.6–13.2)

## 2019-10-16 PROCEDURE — 65270000029 HC RM PRIVATE

## 2019-10-16 PROCEDURE — 85025 COMPLETE CBC W/AUTO DIFF WBC: CPT

## 2019-10-16 PROCEDURE — 74011636637 HC RX REV CODE- 636/637: Performed by: INTERNAL MEDICINE

## 2019-10-16 PROCEDURE — 74011250637 HC RX REV CODE- 250/637: Performed by: INTERNAL MEDICINE

## 2019-10-16 PROCEDURE — 84100 ASSAY OF PHOSPHORUS: CPT

## 2019-10-16 PROCEDURE — 83735 ASSAY OF MAGNESIUM: CPT

## 2019-10-16 PROCEDURE — 97116 GAIT TRAINING THERAPY: CPT

## 2019-10-16 PROCEDURE — 94668 MNPJ CHEST WALL SBSQ: CPT

## 2019-10-16 PROCEDURE — 74011000258 HC RX REV CODE- 258: Performed by: INTERNAL MEDICINE

## 2019-10-16 PROCEDURE — 82962 GLUCOSE BLOOD TEST: CPT

## 2019-10-16 PROCEDURE — 74011250636 HC RX REV CODE- 250/636: Performed by: HOSPITALIST

## 2019-10-16 PROCEDURE — 74011250637 HC RX REV CODE- 250/637: Performed by: HOSPITALIST

## 2019-10-16 PROCEDURE — 80048 BASIC METABOLIC PNL TOTAL CA: CPT

## 2019-10-16 PROCEDURE — 94761 N-INVAS EAR/PLS OXIMETRY MLT: CPT

## 2019-10-16 PROCEDURE — 74011000250 HC RX REV CODE- 250: Performed by: INTERNAL MEDICINE

## 2019-10-16 PROCEDURE — 94640 AIRWAY INHALATION TREATMENT: CPT

## 2019-10-16 PROCEDURE — 74011250636 HC RX REV CODE- 250/636: Performed by: INTERNAL MEDICINE

## 2019-10-16 RX ADMIN — SODIUM CHLORIDE 5.2 UNITS/HR: 9 INJECTION, SOLUTION INTRAVENOUS at 14:18

## 2019-10-16 RX ADMIN — IPRATROPIUM BROMIDE AND ALBUTEROL SULFATE 3 ML: .5; 3 SOLUTION RESPIRATORY (INHALATION) at 08:05

## 2019-10-16 RX ADMIN — IPRATROPIUM BROMIDE AND ALBUTEROL SULFATE 3 ML: .5; 3 SOLUTION RESPIRATORY (INHALATION) at 22:21

## 2019-10-16 RX ADMIN — SERTRALINE HYDROCHLORIDE 150 MG: 50 TABLET ORAL at 22:21

## 2019-10-16 RX ADMIN — OXYCODONE HYDROCHLORIDE 10 MG: 5 TABLET ORAL at 14:49

## 2019-10-16 RX ADMIN — SODIUM CHLORIDE 8.1 UNITS/HR: 9 INJECTION, SOLUTION INTRAVENOUS at 03:44

## 2019-10-16 RX ADMIN — PREDNISONE 60 MG: 10 TABLET ORAL at 09:12

## 2019-10-16 RX ADMIN — CEFEPIME HYDROCHLORIDE 1 G: 1 INJECTION, POWDER, FOR SOLUTION INTRAMUSCULAR; INTRAVENOUS at 23:18

## 2019-10-16 RX ADMIN — HEPARIN SODIUM 5000 UNITS: 5000 INJECTION INTRAVENOUS; SUBCUTANEOUS at 05:56

## 2019-10-16 RX ADMIN — GABAPENTIN 600 MG: 300 CAPSULE ORAL at 09:12

## 2019-10-16 RX ADMIN — GABAPENTIN 600 MG: 300 CAPSULE ORAL at 22:20

## 2019-10-16 RX ADMIN — AMLODIPINE BESYLATE 10 MG: 5 TABLET ORAL at 09:12

## 2019-10-16 RX ADMIN — SODIUM CHLORIDE 18.3 UNITS/HR: 9 INJECTION, SOLUTION INTRAVENOUS at 09:59

## 2019-10-16 RX ADMIN — PANTOPRAZOLE SODIUM 20 MG: 20 TABLET, DELAYED RELEASE ORAL at 22:21

## 2019-10-16 RX ADMIN — GABAPENTIN 600 MG: 300 CAPSULE ORAL at 16:59

## 2019-10-16 RX ADMIN — GUAIFENESIN 1200 MG: 600 TABLET, EXTENDED RELEASE ORAL at 22:21

## 2019-10-16 RX ADMIN — CEFEPIME HYDROCHLORIDE 1 G: 1 INJECTION, POWDER, FOR SOLUTION INTRAMUSCULAR; INTRAVENOUS at 06:47

## 2019-10-16 RX ADMIN — OXYCODONE HYDROCHLORIDE 10 MG: 5 TABLET ORAL at 22:20

## 2019-10-16 RX ADMIN — GUAIFENESIN AND CODEINE PHOSPHATE 10 ML: 100; 10 SOLUTION ORAL at 06:47

## 2019-10-16 RX ADMIN — ARFORMOTEROL TARTRATE 15 MCG: 15 SOLUTION RESPIRATORY (INHALATION) at 22:21

## 2019-10-16 RX ADMIN — HEPARIN SODIUM 5000 UNITS: 5000 INJECTION INTRAVENOUS; SUBCUTANEOUS at 14:25

## 2019-10-16 RX ADMIN — SODIUM CHLORIDE 3.2 UNITS/HR: 9 INJECTION, SOLUTION INTRAVENOUS at 02:46

## 2019-10-16 RX ADMIN — GUAIFENESIN 1200 MG: 600 TABLET, EXTENDED RELEASE ORAL at 09:12

## 2019-10-16 RX ADMIN — CETIRIZINE HYDROCHLORIDE 10 MG: 10 TABLET, FILM COATED ORAL at 09:12

## 2019-10-16 RX ADMIN — MONTELUKAST 10 MG: 10 TABLET, FILM COATED ORAL at 22:21

## 2019-10-16 RX ADMIN — OXYCODONE HYDROCHLORIDE 10 MG: 5 TABLET ORAL at 06:47

## 2019-10-16 RX ADMIN — PANTOPRAZOLE SODIUM 20 MG: 20 TABLET, DELAYED RELEASE ORAL at 06:47

## 2019-10-16 RX ADMIN — QUETIAPINE FUMARATE 300 MG: 300 TABLET ORAL at 22:20

## 2019-10-16 RX ADMIN — IPRATROPIUM BROMIDE AND ALBUTEROL SULFATE 3 ML: .5; 3 SOLUTION RESPIRATORY (INHALATION) at 16:20

## 2019-10-16 RX ADMIN — SERTRALINE HYDROCHLORIDE 100 MG: 50 TABLET ORAL at 09:12

## 2019-10-16 RX ADMIN — IPRATROPIUM BROMIDE AND ALBUTEROL SULFATE 3 ML: .5; 3 SOLUTION RESPIRATORY (INHALATION) at 13:21

## 2019-10-16 RX ADMIN — ARFORMOTEROL TARTRATE 15 MCG: 15 SOLUTION RESPIRATORY (INHALATION) at 08:05

## 2019-10-16 NOTE — PROGRESS NOTES
Internal Medicine Progress Note        NAME: Norberto Suggs   :  1960  MRM:  [id number]    Date/Time: 10/16/2019        ASSESSMENT/PLAN:     # Acute respiratory failure with hypoxemia. Not on home O2, will verify prior DC. Maintain O2. PCCM on board. # Acute COPD exacerbation with Acute Bronchitis. Failed levaquin course . Report she responded to change Abx, changed by PCCM to cefepime. Treated with   steroids, nebulized bronchodilators, antibiotics. Switch atbx to cefepime per Pulm; day 4/5. Start chest PT  Check sputum culture and urine antigens, culture pending, shows few  Jacobstad  . Start robitussin with codeine d/t severe frequent cough. CTA chest is neg for PE but does show tracheomalacia and alveolitis. Consulted Pulm, appreciate recs. - plan per PCCM noted : Transition to prednisone. 60 mg tonight then every morning until discharge. Then taper over next 10 days. Continue neb therapies at discharge for the next 1-2 weeks before resuming typical outpatient therapy. Encourage acapella for secretion clearance prn  Evaluate for possible home Cefepime for a total of 10 days of therapy        # Diabetes mellitus, type 2 . Provide SSI, hypoglycemia protocol and frequent Accu checks. Provide SSI, hypoglycemia protocol and frequent Accu checks. Education,  diabetic educator   Hyperglycemia due to steroids. Increased lantus. -BSL in high 400s and started on  insulin ggt . # Obesity. Body mass index is 39.16 kg/m². # Quit smoking about 3 months ago, slipped at times. Counseled to complete quit      # Deconditioned .  PT.     -Code status : FULL     Lab Review:     Recent Labs     10/16/19  0030 10/15/19  0525 10/14/19  0315   WBC 22.7* 21.1* 22.6*   HGB 8.9* 8.9* 8.6*   HCT 29.5* 29.9* 28.9*    357 371     Recent Labs     10/16/19  0658 10/16/19  0030 10/15/19  1910 10/15/19  1425 10/15/19  0525   NA  --  136  --  133* 133*   K  --  4.4  --  4.6 5.2 CL  --  96*  --  95* 97*   CO2  --  30  --  28 28   GLU  --  213*  --  444* 390*   BUN  --  27*  --  28* 24*   CREA  --  0.93  --  1.18 0.97   CA  --  9.2  --  9.1 8.6   MG 2.5 2.3 2.5 2.2 2.3   PHOS  --  3.9  --  3.5 3.9     Lab Results   Component Value Date/Time    Glucose (POC) 213 (H) 10/16/2019 08:58 AM    Glucose (POC) 116 (H) 10/16/2019 07:54 AM    Glucose (POC) 190 (H) 10/16/2019 06:50 AM    Glucose (POC) 141 (H) 10/16/2019 05:48 AM    Glucose (POC) 156 (H) 10/16/2019 04:47 AM    Glucose,  (H) 08/19/2010 02:20 AM     No results for input(s): PH, PCO2, PO2, HCO3, FIO2 in the last 72 hours. No results for input(s): INR, INREXT, INREXT in the last 72 hours. Lab Results   Component Value Date/Time    Specimen Description: BLOOD 01/09/2011 01:45 AM    Specimen Description: BLOOD 01/09/2011 01:30 AM    Specimen Description: SPUTUM 08/19/2010 01:00 PM     Lab Results   Component Value Date/Time    Culture result: FEW KLEBSIELLA PNEUMONIAE (A) 10/09/2019 10:44 AM    Culture result: MODERATE NORMAL RESPIRATORY STEPHON 10/09/2019 10:44 AM    Culture result:  05/12/2016 12:03 PM     MRSA target DNA is not detected (presumptive not colonized with MRSA)       All Cardiac Markers in the last 24 hours: No results found for: CPK, CK, CKMMB, CKMB, RCK3, CKMBT, CKNDX, CKND1, HUBER, TROPT, TROIQ, KAL, TROPT, TNIPOC, BNP, BNPP           Subjective:     Chief Complaint:      I feel better today. Declined eating diabetic diet. ROS:  (bold if positive,otherwise negative)    Fever/chills ,  Dysuria   Cough ,   SOB/LYNCH , Chest Pain     Diarrhea ,Nausea/Vomit , Abd Pain , Constipation     Tolerating Diet                Objective:     Vitals:  Last 24hrs VS reviewed since prior progress note. Most recent are:    Visit Vitals  /64   Pulse 70   Temp 98.3 °F (36.8 °C)   Resp 20   Ht 5' 7\" (1.702 m)   Wt 113.4 kg (250 lb)   SpO2 98%   Breastfeeding?  No   BMI 39.16 kg/m²     SpO2 Readings from Last 6 Encounters: 10/16/19 98%   09/24/19 98%   12/21/18 93%   11/16/18 97%   10/12/18 96%   06/02/16 97%    O2 Flow Rate (L/min): 2 l/min       Intake/Output Summary (Last 24 hours) at 10/16/2019 0954  Last data filed at 10/16/2019 0034  Gross per 24 hour   Intake 360 ml   Output 300 ml   Net 60 ml          Physical Exam:   Ears: hearing is intact  Eyes: Icterus is not present  Lungs: improved  wheezes bilaterally,   Heart: regular rate and rhythm, S1, S2 normal, no murmur, click, rub or gallop  Gastrointestinal: soft, non-tender.  Bowel sounds normal. No masses,  no organomegaly  Neurological:  New Focal Deficits are not present  Psychiatric:  Mood is stable    Medications Reviewed: (see below)    Lab Data Reviewed: (see below)    ______________________________________________________________________    Medications:     Current Facility-Administered Medications   Medication Dose Route Frequency    guaiFENesin-codeine (ROBITUSSIN AC) 100-10 mg/5 mL solution 10 mL  10 mL Oral Q6H PRN    insulin regular (NOVOLIN R, HUMULIN R) 100 Units in 0.9% sodium chloride 100 mL infusion  0-50 Units/hr IntraVENous TITRATE    dextrose 10% infusion 125-250 mL  125-250 mL IntraVENous PRN    predniSONE (DELTASONE) tablet 60 mg  60 mg Oral DAILY WITH BREAKFAST    oxyCODONE IR (ROXICODONE) tablet 10 mg  10 mg Oral Q6H PRN    arformoterol (BROVANA) neb solution 15 mcg  15 mcg Nebulization BID RT    albuterol-ipratropium (DUO-NEB) 2.5 MG-0.5 MG/3 ML  3 mL Nebulization QID RT    cefepime (MAXIPIME) 1 g in 0.9% sodium chloride (MBP/ADV) 50 mL MBP  1 g IntraVENous Q12H    chlorpheniramine-HYDROcodone (TUSSIONEX) oral suspension 5 mL  5 mL Oral Q12H PRN    pantoprazole (PROTONIX) tablet 20 mg  20 mg Oral ACB/HS    guaiFENesin ER (MUCINEX) tablet 1,200 mg  1,200 mg Oral Q12H    albuterol (PROVENTIL VENTOLIN) nebulizer solution 2.5 mg  2.5 mg Nebulization Q1H PRN    naloxone (NARCAN) injection 0.4 mg  0.4 mg IntraVENous PRN    cyclobenzaprine (FLEXERIL) tablet 10 mg  10 mg Oral Q12H PRN    cetirizine (ZYRTEC) tablet 10 mg  10 mg Oral DAILY    hydrOXYzine HCl (ATARAX) tablet 25 mg  25 mg Oral Q12H PRN    amLODIPine (NORVASC) tablet 10 mg  10 mg Oral DAILY    clonazePAM (KlonoPIN) tablet 1 mg  1 mg Oral TID PRN    gabapentin (NEURONTIN) capsule 600 mg  600 mg Oral TID    montelukast (SINGULAIR) tablet 10 mg  10 mg Oral QHS    QUEtiapine (SEROquel) tablet 300 mg  300 mg Oral QHS    sertraline (ZOLOFT) tablet 150 mg  150 mg Oral QHS    acetaminophen (TYLENOL) tablet 650 mg  650 mg Oral Q4H PRN    heparin (porcine) injection 5,000 Units  5,000 Units SubCUTAneous Q8H    glucose chewable tablet 16 g  4 Tab Oral PRN    glucagon (GLUCAGEN) injection 1 mg  1 mg IntraMUSCular PRN    sertraline (ZOLOFT) tablet 100 mg  100 mg Oral DAILY          Total time spent with patient: 35 minutes                  Care Plan discussed with: Patient, Care Manager, Nursing Staff and >50% of time spent in counseling and coordination of care    Discussed:  Care Plan    Prophylaxis:  Hep SQ    Disposition:  Home w/Family             Attending Physician: Bhavik Herrera MD

## 2019-10-16 NOTE — PROGRESS NOTES
Bedside shift change report given to ARACELI Escamilla (oncoming nurse) by Guerita Snider RN (offgoing nurse). Report included the following information SBAR, Kardex, Intake/Output, MAR and Recent Results.      0912-- AM medications given, well tolerated, will continue to monitor.      1152 -- Reassessment completed, no change in patient condition, will continue to monitor.      1510 -- Reassessment completed, no change in patient condition, will continue to monitor.      1629 -- Patient's son arrives with a medium size cheese pizza with green peppers, patient and son educated. 12 -- Patient's daughter arrives with feather & fin chicken, nurse to room, patient states, \"That's for later. \"    7000 -- Dinner trays came and patient kept that also. Bedside shift change report given to ARACELI Keys (oncoming nurse) by Wendi Ferrari RN (offgoing nurse). Report included the following information SBAR, Kardex, Intake/Output, MAR and Recent Results.

## 2019-10-16 NOTE — PROGRESS NOTES
Problem: Diabetes Self-Management  Goal: *Disease process and treatment process  Description  Define diabetes and identify own type of diabetes; list 3 options for treating diabetes. Outcome: Progressing Towards Goal  Goal: *Incorporating nutritional management into lifestyle  Description  Describe effect of type, amount and timing of food on blood glucose; list 3 methods for planning meals. Outcome: Progressing Towards Goal  Goal: *Incorporating physical activity into lifestyle  Description  State effect of exercise on blood glucose levels. Outcome: Progressing Towards Goal  Goal: *Developing strategies to promote health/change behavior  Description  Define the ABC's of diabetes; identify appropriate screenings, schedule and personal plan for screenings. Outcome: Progressing Towards Goal  Goal: *Using medications safely  Description  State effect of diabetes medications on diabetes; name diabetes medication taking, action and side effects. Outcome: Progressing Towards Goal  Goal: *Monitoring blood glucose, interpreting and using results  Description  Identify recommended blood glucose targets  and personal targets. Outcome: Progressing Towards Goal  Goal: *Prevention, detection, treatment of acute complications  Description  List symptoms of hyper- and hypoglycemia; describe how to treat low blood sugar and actions for lowering  high blood glucose level. Outcome: Progressing Towards Goal  Goal: *Prevention, detection and treatment of chronic complications  Description  Define the natural course of diabetes and describe the relationship of blood glucose levels to long term complications of diabetes.   Outcome: Progressing Towards Goal  Goal: *Developing strategies to address psychosocial issues  Description  Describe feelings about living with diabetes; identify support needed and support network  Outcome: Progressing Towards Goal  Goal: *Sick day guidelines  Outcome: Progressing Towards Goal  Goal: *Patient Specific Goal (EDIT GOAL, INSERT TEXT)  Outcome: Progressing Towards Goal     Problem: Patient Education: Go to Patient Education Activity  Goal: Patient/Family Education  Outcome: Progressing Towards Goal     Problem: Pain  Goal: *Control of Pain  Outcome: Progressing Towards Goal     Problem: Patient Education: Go to Patient Education Activity  Goal: Patient/Family Education  Outcome: Progressing Towards Goal     Problem: Chronic Obstructive Pulmonary Disease (COPD)  Goal: *Oxygen saturation during activity within specified parameters  Outcome: Progressing Towards Goal  Goal: *Able to remain out of bed as prescribed  Outcome: Progressing Towards Goal  Goal: *Absence of hypoxia  Outcome: Progressing Towards Goal  Goal: *Optimize nutritional status  Outcome: Progressing Towards Goal     Problem: Patient Education: Go to Patient Education Activity  Goal: Patient/Family Education  Outcome: Progressing Towards Goal     Problem: Falls - Risk of  Goal: *Absence of Falls  Description  Document Jimmy Strong Fall Risk and appropriate interventions in the flowsheet.   Outcome: Progressing Towards Goal  Note:   Fall Risk Interventions:            Medication Interventions: Patient to call before getting OOB    Elimination Interventions: Call light in reach              Problem: Patient Education: Go to Patient Education Activity  Goal: Patient/Family Education  Outcome: Progressing Towards Goal     Problem: Discharge Planning  Goal: *Discharge to safe environment  Outcome: Progressing Towards Goal     Problem: Diabetes Maintenance:Ongoing  Goal: *Blood Glucose 80 to 180 md/dl  Description  Insulin management with steroids    Outcome: Progressing Towards Goal     Problem: Altered nutrition  Goal: *Optimize nutritional status  Outcome: Progressing Towards Goal     Problem: Patient Education: Go to Patient Education Activity  Goal: Patient/Family Education  Outcome: Progressing Towards Goal

## 2019-10-16 NOTE — DIABETES MGMT
Glycemic Control Plan of Care    Continues on glucostabilizer - started on PO prednisone today.   Reviewed with patient her recent BG and insulin drip protocol - she continues to eat food brought in by family and counseled that this will prolong her stay and time on the insulin drip -         Will continue to monitor     Selvin Steven MPH RN  Pager [phone]  Office [phone]

## 2019-10-16 NOTE — PROGRESS NOTES
Problem: Mobility Impaired (Adult and Pediatric)  Goal: *Acute Goals and Plan of Care (Insert Text)  Description  Physical Therapy Goals  Initiated 10/14/2019 and to be accomplished within 7 day(s)  1. Patient will move from supine to sit and sit to supine  in bed with modified independence. 2.  Patient will transfer from bed to chair and chair to bed with modified independence using the least restrictive device. 3.  Patient will perform sit to stand with modified independence. 4.  Patient will ambulate with modified independence for 150 feet with the least restrictive device. 5.  Patient will ascend/descend 10 stairs with handrail(s) with modified independence. Outcome: Resolved/Met   PHYSICAL THERAPY TREATMENT AND DISCHARGE    Patient: Cisco Alvarado (61 y.o. female)  Date: 10/16/2019  Diagnosis: COPD exacerbation ([facility] [address] [J44.1]  Asthma exacerbation [J45.901] Acute respiratory failure with hypoxemia ([facility] [address]  Precautions: Fall  PLOF: Mod I  ASSESSMENT:  Based on the objective data described below, the patient presents with appropriate mobility for discharge to home. Mod I for supine to sit and sit to stand. Amb mod I with ww 370ft. Demonstrates appropriate safety awareness. Declines trial of stairs. Reports family to be home with her 24/7. On room air entire session without difficulties in mobility. Returned to seated in bed. Call bell in reach. PLAN:  Maximum therapeutic gains met at current level of care and patient will be discharged from physical therapy at this time. Further skilled physical therapy is not indicated at this time. Rationale for discharge:  ?     Goals Achieved  ? Plateau Reached  ? Patient not participating in therapy  ? Other: Declines stairs  Discharge Recommendations:  None  Further Equipment Recommendations for Discharge:  straight cane; has     SUBJECTIVE:   Patient stated I can breathe now.     OBJECTIVE DATA SUMMARY:   Critical Behavior:  Neurologic State: Alert  Orientation Level: Oriented X4  Cognition: Follows commands     Functional Mobility Training:  Bed Mobility:  Supine to Sit: Modified independent  Sit to Supine: Modified independent  Transfers:  Sit to Stand: Modified independent  Stand to Sit: Modified independent  Balance:  Sitting: Intact  Standing: Intact   Ambulation/Gait Training:  Distance (ft): 370 Feet (ft)  Assistive Device: Walker, rolling  Ambulation - Level of Assistance: Modified independent    Pain:  Pain level pre-treatment: 0/10   Pain level post-treatment: 0/10     Activity Tolerance:   Good    After treatment:   ? Patient left in no apparent distress sitting up in chair  ? Patient left in no apparent distress in bed  ? Call bell left within reach  ? Nursing notified  ? Caregiver present  ? Bed alarm activated  ? SCDs applied      COMMUNICATION/EDUCATION:   ?         Role of physical therapy in the acute care setting. ?         Fall prevention education was provided and the patient/caregiver indicated understanding. ? Patient/family have participated as able and agree with findings and recommendations. ?         Patient is unable to participate in plan of care at this time.           Christine Ruiz, PT   Time Calculation: 8 mins

## 2019-10-16 NOTE — PROGRESS NOTES
Problem: Discharge Planning  Goal: *Discharge to safe environment  Outcome: Progressing Towards Goal     Home with possible hh    If home  IV abx needed will need specific order for IV including dose length of need with end date. To cost out with insurance. Under home health also include labs needed.  Will also need PICC line as well as care orders.

## 2019-10-16 NOTE — PROGRESS NOTES
Problem: Falls - Risk of  Goal: *Absence of Falls  Description  [facility] and appropriate interventions in the flowsheet.   Outcome: Progressing Towards Goal  Note:   Fall Risk Interventions:            Medication Interventions: Patient to call before getting OOB, Teach patient to arise slowly    Elimination Interventions: Call light in reach, Patient to call for help with toileting needs              Problem: Patient Education: Go to Patient Education Activity  Goal: Patient/Family Education  Outcome: Progressing Towards Goal

## 2019-10-16 NOTE — PROGRESS NOTES
Respiratory Therapy Assessment Care Plan    Patient:  Emily Lugo 64 y.o. female 10/16/2019 8:13 AM    COPD exacerbation ([facility] [address] [J44.1]  Asthma exacerbation [J45.901]      Chest X-RAY:   Results from Hospital Encounter encounter on 10/08/19   XR CHEST PA LAT    Impression IMPRESSION:    No significant interval change or acute pulmonary process identified. Results from [place] encounter on 05/31/16   XR KNEE RT MAX 2 VWS    Impression IMPRESSION:    Right total knee arthroplasty in good anatomic alignment. Results from [place] encounter on 05/26/13   XR ABD FLAT/ ERECT    Impression IMPRESSION: No acute findings in the chest or abdomen                  Vital Signs:   Visit Vitals  /64   Pulse 70   Temp 98.3 °F (36.8 °C)   Resp 20   Ht 5' 7\" (1.702 m)   Wt 113.4 kg (250 lb)   SpO2 98%   Breastfeeding? No   BMI 39.16 kg/m²           Indications for treatment: Asthma and COPD Exacerbation. Plan of care: Duoneb 4 x daily as ordered. Brovana   Q12. Goal: Maintain patient on RA with SpO2 greater than                 92%. Discharge home.

## 2019-10-17 VITALS
BODY MASS INDEX: 39.24 KG/M2 | HEIGHT: 67 IN | RESPIRATION RATE: 20 BRPM | TEMPERATURE: 98.3 F | HEART RATE: 75 BPM | WEIGHT: 250 LBS | OXYGEN SATURATION: 99 % | DIASTOLIC BLOOD PRESSURE: 60 MMHG | SYSTOLIC BLOOD PRESSURE: 117 MMHG

## 2019-10-17 PROCEDURE — 74011250637 HC RX REV CODE- 250/637: Performed by: INTERNAL MEDICINE

## 2019-10-17 PROCEDURE — 74011636637 HC RX REV CODE- 636/637: Performed by: INTERNAL MEDICINE

## 2019-10-17 PROCEDURE — 94640 AIRWAY INHALATION TREATMENT: CPT

## 2019-10-17 PROCEDURE — 94761 N-INVAS EAR/PLS OXIMETRY MLT: CPT

## 2019-10-17 PROCEDURE — 74011250637 HC RX REV CODE- 250/637: Performed by: HOSPITALIST

## 2019-10-17 PROCEDURE — 74011000250 HC RX REV CODE- 250: Performed by: INTERNAL MEDICINE

## 2019-10-17 PROCEDURE — 94668 MNPJ CHEST WALL SBSQ: CPT

## 2019-10-17 RX ORDER — INSULIN LISPRO 100 [IU]/ML
INJECTION, SOLUTION INTRAVENOUS; SUBCUTANEOUS
Status: DISCONTINUED | OUTPATIENT
Start: 2019-10-17 | End: 2019-10-17 | Stop reason: HOSPADM

## 2019-10-17 RX ORDER — METHYLPREDNISOLONE 4 MG/1
TABLET ORAL
Qty: 1 DOSE PACK | Refills: 0 | Status: SHIPPED | OUTPATIENT
Start: 2019-10-17 | End: 2020-11-06

## 2019-10-17 RX ORDER — IPRATROPIUM BROMIDE AND ALBUTEROL SULFATE 2.5; .5 MG/3ML; MG/3ML
3 SOLUTION RESPIRATORY (INHALATION)
Qty: 30 NEBULE | Refills: 0 | Status: SHIPPED | OUTPATIENT
Start: 2019-10-17

## 2019-10-17 RX ORDER — CEPHALEXIN 500 MG/1
500 CAPSULE ORAL 4 TIMES DAILY
Qty: 16 CAP | Refills: 0 | Status: SHIPPED | OUTPATIENT
Start: 2019-10-17 | End: 2019-10-21

## 2019-10-17 RX ORDER — HYDROCODONE POLISTIREX AND CHLORPHENIRAMINE POLISTIREX 10; 8 MG/5ML; MG/5ML
5 SUSPENSION, EXTENDED RELEASE ORAL
Qty: 60 ML | Refills: 0 | Status: SHIPPED | OUTPATIENT
Start: 2019-10-17 | End: 2019-10-20

## 2019-10-17 RX ADMIN — GUAIFENESIN AND CODEINE PHOSPHATE 10 ML: 100; 10 SOLUTION ORAL at 08:07

## 2019-10-17 RX ADMIN — PREDNISONE 60 MG: 10 TABLET ORAL at 08:07

## 2019-10-17 RX ADMIN — PANTOPRAZOLE SODIUM 20 MG: 20 TABLET, DELAYED RELEASE ORAL at 08:07

## 2019-10-17 RX ADMIN — IPRATROPIUM BROMIDE AND ALBUTEROL SULFATE 3 ML: .5; 3 SOLUTION RESPIRATORY (INHALATION) at 08:08

## 2019-10-17 RX ADMIN — ARFORMOTEROL TARTRATE 15 MCG: 15 SOLUTION RESPIRATORY (INHALATION) at 08:19

## 2019-10-17 RX ADMIN — GUAIFENESIN 1200 MG: 600 TABLET, EXTENDED RELEASE ORAL at 08:13

## 2019-10-17 RX ADMIN — GABAPENTIN 600 MG: 300 CAPSULE ORAL at 08:13

## 2019-10-17 RX ADMIN — CETIRIZINE HYDROCHLORIDE 10 MG: 10 TABLET, FILM COATED ORAL at 08:14

## 2019-10-17 RX ADMIN — SERTRALINE HYDROCHLORIDE 100 MG: 50 TABLET ORAL at 08:14

## 2019-10-17 NOTE — PROGRESS NOTES
Bedside shift change report given to ARACELI Escamilla (oncoming nurse) by Javier Cavazos RN (offgoing nurse). Report included the following information SBAR, Kardex, Intake/Output, MAR and Recent Results. 4327 -- Patient refused vitals this morning. 1847 -- Patient refused BG this morning, patient is over the whole process.      0815 -- AM medications given, well tolerated, will continue to monitor. IV med unable to give because patient took her IV out.      1114 -- Reassessment completed, no change in patient condition, will continue to monitor. Patient refused BG stick.     1210 -- Patient discharged.  Ester Simms

## 2019-10-17 NOTE — PROGRESS NOTES
Pulmonary Progress Note    History  65 y/o woman admitted to the hospital on 10/10/19 for an AECOPD with acute on chronic cough after failing outpatient therapy with Levaquin, Mucinex and oral steroids. Chest CT tracheobronchomalacia, groundglass infiltrates and emphysema. She sees no change in her respiratory status with the change to oral steroids. She is very interested in going home. Exam  Alert and oriented. No respiratory distress at rest.  Normal affect  Blood pressure 122/65, pulse 79, temperature 97.6 °F (36.4 °C), resp. rate 18, height 5' 7\" (1.702 m), weight 113.4 kg (250 lb), SpO2 98 %, not currently breastfeeding. No wheezing  RRR  Soft  No cyanosis, clubbing or edema  No gross motor deficit. Gaze conjugate    WBC 22.7 with 1% bands  BUN 27 and Cr 1.04    Assessment  AECOPD failing outpatient therapies including Levaquin much improved with current therapies  DMII with poor control secondary to steroids    Plan  Wean prednisone  Continue neb therapies at discharge for the next 1-2 weeks before resuming typical outpatient therapy. Encourage acapella for secretion clearance prn  Evaluate for possible home Cefepime for a total of 10 days of therapy    No contraindication to discharge from my perspective.

## 2019-10-17 NOTE — PROGRESS NOTES
Respiratory Therapy Assessment Care Plan    Patient:  Shawn Phipps 64 y.o. female 10/17/2019 10:49 AM    COPD exacerbation ([facility] [address] [J44.1]  Asthma exacerbation [J45.901]      Chest X-RAY:   Results from Hospital Encounter encounter on 10/08/19   XR CHEST PA LAT    Impression IMPRESSION:    No significant interval change or acute pulmonary process identified. Results from [place] encounter on 05/31/16   XR KNEE RT MAX 2 VWS    Impression IMPRESSION:    Right total knee arthroplasty in good anatomic alignment. Results from [place] encounter on 05/26/13   XR ABD FLAT/ ERECT    Impression IMPRESSION: No acute findings in the chest or abdomen              Vital Signs:     Visit Vitals  /72   Pulse 74   Temp 98.6 °F (37 °C)   Resp 18   Ht 5' 7\" (1.702 m)   Wt 113.4 kg (250 lb)   SpO2 98%   Breastfeeding? No   BMI 39.16 kg/m²         Indications for treatment: COPD/Asthma Exacerbation    Plan of care: LABA/MARLENY , Flutter Valve ( aerobika and vibraPep ) at bedside for patient use and to enhance chest and clearing and maintenance . Goal: Patient to receive medications as ordered. Patient to understand use and need . Patient to continue use upon discharge . Patient to use and understand airway clearing devices while admitted and benefits of home use.

## 2019-10-17 NOTE — ROUTINE PROCESS
1920  Bedside and Verbal shift change report given to Ludmila (oncoming nurse) by Radha Ba (offgoing nurse). Report included the following information SBAR, Kardex, Intake/Output and MAR. Pt stating that she is not going to continue the insulin gtt throughout the night and will go AMA if we don't stop it. Discussed issues with insulin gtt at length with patient and she is agreeable to stay the night if we are able to transition her off the gtt tonight. 2250  Discussed pt with Dr. Lore Sepulveda. Made aware that pt is refusing to continue insulin gtt throughout the night. Per Dr. Lore Sepulveda, will review pt and put in orders. 18  Pt now stating that she is refusing insulin gtt and will go AMA if not stopped Dr. Mancuso Monday made aware. States he will take a look and put orders in. 
 
0430  Pt refusing lab draws & recheck of BS since shutting off insulin gtt 
 
0720  Bedside and Verbal shift change report given to Francine HOLLIDAY (oncoming nurse) by Pauline Ray (offgoing nurse). Report included the following information SBAR, Kardex, Intake/Output and MAR.

## 2019-10-17 NOTE — DISCHARGE INSTRUCTIONS
Please monitor your blood sugar twice daily while you complete your steroid taper -      Your A1C  was   Lab Results   Component Value Date/Time    Hemoglobin A1c 7.4 (H) 10/08/2019 03:44 PM   .      This lab test reflects that your blood sugar was slightly elevated over the past 3 months and your average blood glucose has been 166 mg/dl. Please follow up with Angie Maddox NP      DISCHARGE SUMMARY from Nurse    PATIENT INSTRUCTIONS:    After general anesthesia or intravenous sedation, for 24 hours or while taking prescription Narcotics:  · Limit your activities  · Do not drive and operate hazardous machinery  · Do not make important personal or business decisions  · Do  not drink alcoholic beverages  · If you have not urinated within 8 hours after discharge, please contact your surgeon on call. Report the following to your surgeon:  · Excessive pain, swelling, redness or odor of or around the surgical area  · Temperature over 100.5  · Nausea and vomiting lasting longer than 4 hours or if unable to take medications  · Any signs of decreased circulation or nerve impairment to extremity: change in color, persistent  numbness, tingling, coldness or increase pain  · Any questions    What to do at Home:  Recommended activity: Activity as tolerated    If you experience any of the following symptoms fever, chills, worsening shortness of breath, or palpitations, please follow up with primary care physician/emergency room. *  Please give a list of your current medications to your Primary Care Provider. *  Please update this list whenever your medications are discontinued, doses are      changed, or new medications (including over-the-counter products) are added. *  Please carry medication information at all times in case of emergency situations.     These are general instructions for a healthy lifestyle:    No smoking/ No tobacco products/ Avoid exposure to second hand smoke  Surgeon Aleah Blackman Warning:  Quitting smoking now greatly reduces serious risk to your health. Obesity, smoking, and sedentary lifestyle greatly increases your risk for illness    A healthy diet, regular physical exercise & weight monitoring are important for maintaining a healthy lifestyle    You may be retaining fluid if you have a history of heart failure or if you experience any of the following symptoms:  Weight gain of 3 pounds or more overnight or 5 pounds in a week, increased swelling in our hands or feet or shortness of breath while lying flat in bed. Please call your doctor as soon as you notice any of these symptoms; do not wait until your next office visit. The discharge information has been reviewed with the patient. The patient verbalized understanding. Discharge medications reviewed with the patient and appropriate educational materials and side effects teaching were provided. Patient armband removed and shredded.

## 2019-10-17 NOTE — PROGRESS NOTES
Problem: Discharge Planning  Goal: *Discharge to safe environment  Outcome: Resolved / met     Home       Care Management Interventions  PCP Verified by CM: Yes  Palliative Care Criteria Met (RRAT>21 & CHF Dx)?: No  Transition of Care Consult (CM Consult): Other(home)  Current Support Network: Other(DtR and son in law)  Confirm Follow Up Transport: Family  Plan discussed with Pt/Family/Caregiver: Yes  Discharge Location  Discharge Placement: Home       Spoke with patient about IV abx, patient refused IV abx. MD was made aware and changed to PO abx. Patient refused H2H. Patient family to  patient and transport home.  Patient independently walking around in room in no distress on room air.

## 2019-10-17 NOTE — PROGRESS NOTES
Discharge instructions provided to pt on behalf of primary nurse Francine Reed RN. Written prescriptions for cephalexin, tussionex, and methylprednisolone were given to the pt. Duo-neb was e-signed to pt [facility]. Pt escorted via wheelchair to car to be driven by family member. Pt left in stable condition.

## 2019-10-17 NOTE — DISCHARGE SUMMARY
Discharge Summary      [facility] service    Patient ID:  Luz Elena Alba, 64 y.o., female  : 1960    Admit Date: 10/8/2019  Discharge Date:  10/17/2019  Length of stay: 7 day(s)    PCP:  Kim Hernandez NP    Chief Complaint   Patient presents with    Cough    Lethargy       Discharge Diagnosis:     Hospital Problems  Date Reviewed: 2016          Codes Class Noted POA    Asthma exacerbation ICD-10-CM: J45.901  ICD-9-CM: 493.92  10/10/2019 Unknown        COPD exacerbation ([facility] ICD-10-CM: J44.1  ICD-9-CM: 491.21  10/8/2019 Yes        DM (diabetes mellitus) ([facility] ICD-10-CM: E11.9  ICD-9-CM: 250.00  10/8/2019 Yes        * (Principal) Acute respiratory failure with hypoxemia ([facility] ICD-10-CM: J96.01  ICD-9-CM: 518.81  10/8/2019 Yes        Obesity ICD-10-CM: K78.5  ICD-9-CM: 278.00  11/15/2012 Yes              Discharge instructions:    #  Discharge Diet:            Diet: Diabetic Diet and Resume previous diet  # Discharge activity and restrictions: Activity as tolerated    # Follow-up appointments: Follow-up Information     Follow up With Specialties Details Why Contact Info    Kim Hernandez NP General Practice, Nurse Practitioner On 10/22/2019 at 10:00 am [address] [place] [address]  [phone]              HPI on Admission (per admitting physician):  Luz Elena Alba is a 64 y.o. female with a noted PMHx of who presented to the ED with worsening, constant fevers, fatigue, productive cough, and dyspnea for the past 14 days. She was seen by her outpatient pulmonologist Dr. Mat Rivas who sees her for COPD. He prescribed levaquin x 14 days, breathing treatments, muccinex, and steroids without relief. Dr. Mat Rivas sent patient to the ED for further eval.  CXR was unremarkable for PNA or any other acute cardiopulm abnorm. She was given multiple breathing treatments, steroids, IV mag, fluids. ABG unremarkable.   She is requiring oxygen which she does not wear as an outpatient. For further details and initial management please refer to H/P. Hospital Course:      Feel much better, all symptoms much better, dressed in her room asking to be discharged soon. By Problems :     # Acute respiratory failure with hypoxemia. Not on home O2, will verify prior DC. Maintain O2. PCCM on board.      # Acute COPD exacerbation with Acute Bronchitis. Failed levaquin course . Report she responded to change Abx, changed by PCCM to cefepime. Treated with   steroids, nebulized bronchodilators, antibiotics. Switch atbx to cefepime per Pulm; day 4/5. Start chest PT  Check sputum culture and urine antigens, culture pending, shows few  [place]  . Start robitussin with codeine d/t severe frequent cough. CTA chest is neg for PE but does show tracheomalacia and alveolitis. Consulted Pulm, appreciate recs. - plan per PCCM noted : Transition to prednisone.  60 mg tonight then every morning until discharge.  Then taper over next 10 days. Continue neb therapies at discharge for the next 1-2 weeks before resuming typical outpatient therapy. Encourage acapella for secretion clearance prn  Evaluate for possible home Cefepime for a total of 10 days of therapy. patient failed OP therapy including LVQ.         # Diabetes mellitus, type 2 . Provide SSI, hypoglycemia protocol and frequent Accu checks. Provide SSI, hypoglycemia protocol and frequent Accu checks. Education,  diabetic educator . Hyperglycemia due to steroids. Increased lantus. BSL in high 400s and started on  insulin ggt . today BSL controlled and better. patient warned that though steroid is to be tapered and she will on her home medications but still need to closely watch and control her BSL as still at high risk of high BSL due to steroid.      # Obesity. Body mass index is 39.16 kg/m². Counseled to loose Wt.         # Quit smoking about 3 months ago, slipped at times.  Counseled to complete quit      # Deconditioned . PT.         Addendum : patient declined OP iv Abx. Dw PCCM, we agree on po Keflex as responded to cefepime and its sensitive per sputum CS. Discharge condition:  improved and stable    Disposition:  Home    Procedures:   * No surgery found *      Consultants: Pulmonary/Intensive care    Physical Exam on Discharge:  Visit Vitals  /72   Pulse 74   Temp 98.6 °F (37 °C)   Resp 18   Ht 5' 7\" (1.702 m)   Wt 113.4 kg (250 lb)   SpO2 98%   Breastfeeding? No   BMI 39.16 kg/m²   Ears: hearing is intact  Eyes: Icterus is not present  Lungs: improved  wheezes bilaterally, CTA currently   Heart: regular rate and rhythm, S1, S2 normal, no murmur, click, rub or gallop  Gastrointestinal: soft, non-tender. Bowel sounds normal. No masses,  no organomegaly  Neurological:  New Focal Deficits are not present  Psychiatric:  Mood is stable    Hospitalization and discharge instructions d/c in details with : patient ,PCCM , Nursing/CM     Discharge medications :  Current Discharge Medication List      START taking these medications    Details   albuterol-ipratropium (DUO-NEB) 2.5 mg-0.5 mg/3 ml nebu 3 mL by Nebulization route every four (4) hours as needed (SOB WHEEZES). Qty: 30 Nebule, Refills: 0      Nebulizer Accessories kit NEBULIZER MACHINE X1  Qty: 1 Kit, Refills: 0      cefepime 1 gram 1 g IVPB 1 g by IntraVENous route every twelve (12) hours every twelve (12) hours. Qty: 8 Dose, Refills: 0      chlorpheniramine-HYDROcodone (TUSSIONEX) 10-8 mg/5 mL suspension Take 5 mL by mouth every twelve (12) hours as needed for Cough for up to 3 days. Max Daily Amount: 10 mL. Qty: 60 mL, Refills: 0    Associated Diagnoses: COPD exacerbation (HCC)      methylPREDNISolone (MEDROL DOSEPACK) 4 mg tablet USE AS DIRECTED  Qty: 1 Dose Pack, Refills: 0         CONTINUE these medications which have NOT CHANGED    Details   dupilumab (DUPIXENT SC) 300 mg/mL by SubCUTAneous route Once every 2 weeks.  Indications: a type of skin condition with redness and itching called eczema      benralizumab (FASENRA SC) by SubCUTAneous route. Every 3 weeks   Indications: asthma      aspirin delayed-release 81 mg tablet Take 81 mg by mouth daily. atorvastatin (LIPITOR) 40 mg tablet Take 40 mg by mouth daily. cetirizine (ZYRTEC) 10 mg tablet Take 10 mg by mouth daily. cyclobenzaprine (FLEXERIL) 10 mg tablet Take 10 mg by mouth every twelve (12) hours as needed for Muscle Spasm(s). Indications: muscle spasm      hydrOXYzine HCl (ATARAX) 25 mg tablet Take 25 mg by mouth every twelve (12) hours as needed for Itching. metFORMIN (GLUCOPHAGE) 1,000 mg tablet Take 1,000 mg by mouth two (2) times daily (with meals). naloxone (NARCAN) 4 mg/actuation nasal spray 1 [place] by IntraNASal route once as needed. Use 1 spray intranasally, then discard. Repeat with new spray every 2 min as needed for opioid overdose symptoms, alternating nostrils. nystatin (MYCOSTATIN) topical cream Apply  to affected area two (2) times a day. Indications: under breast as needed      tiotropium bromide (SPIRIVA RESPIMAT) 2.5 mcg/actuation inhaler Take 2 Puffs by inhalation daily. ergocalciferol (VITAMIN D2) 50,000 unit capsule Take 50,000 Units by mouth two (2) times a week.      gabapentin (NEURONTIN) 600 mg tablet Take 600 mg by mouth three (3) times daily as needed. Indications: Neuropathic Pain      triamcinolone acetonide (KENALOG) 0.1 % topical cream Apply  to affected area two (2) times a day. use thin layer   Indications: CONTACT DERMATITIS      loratadine (CLARITIN) 10 mg tablet Take 10 mg by mouth daily. pantoprazole (PROTONIX) 40 mg tablet Take 40 mg by mouth daily. QUEtiapine (SEROQUEL) 300 mg tablet Take 400 mg by mouth nightly. sertraline (ZOLOFT) 100 mg tablet Take 100 mg by mouth nightly. Take 2.5 tablets daily. clonazePAM (KLONOPIN) 1 mg tablet Take 1 mg by mouth three (3) times daily as needed.       albuterol (PROVENTIL HFA, VENTOLIN HFA, PROAIR HFA) 90 mcg/actuation inhaler Take 2 Puffs by inhalation every four (4) hours as needed for Wheezing.      montelukast (SINGULAIR) 10 mg tablet Take 10 mg by mouth nightly. ondansetron (ZOFRAN ODT) 8 mg disintegrating tablet Take 1 Tab by mouth every eight (8) hours as needed for Nausea. Qty: 12 Tab, Refills: none      amLODIPine (NORVASC) 10 mg tablet Take  by mouth daily. oxyCODONE IR (ROXICODONE) 20 mg immediate release tablet Take 1 Tab by mouth every four (4) hours as needed. Max Daily Amount: 120 mg.  Qty: 60 Tab, Refills: 0      glimepiride (AMARYL) 2 mg tablet Take 2 mg by mouth daily (with breakfast). STOP taking these medications       DICLOFENAC SODIUM PO Comments:   Reason for Stopping:         meloxicam (MOBIC) 7.5 mg tablet Comments:   Reason for Stopping:                   Most Recent Labs:       Recent Labs     10/16/19  0030 10/15/19  0525   WBC 22.7* 21.1*   HGB 8.9* 8.9*   HCT 29.5* 29.9*    357     Recent Labs     10/16/19  1435 10/16/19  1100 10/16/19  0658 10/16/19  0030  10/15/19  1425 10/15/19  0525     --   --  136  --  133* 133*   K 4.6  --   --  4.4  --  4.6 5.2   CL 98*  --   --  96*  --  95* 97*   CO2 29  --   --  30  --  28 28   *  --   --  213*  --  444* 390*   BUN 27*  --   --  27*  --  28* 24*   CREA 1.04  --   --  0.93  --  1.18 0.97   CA 9.2  --   --  9.2  --  9.1 8.6   MG 2.5 2.4 2.5 2.3   < > 2.2 2.3   PHOS  --   --   --  3.9  --  3.5 3.9    < > = values in this interval not displayed. Lab Results   Component Value Date/Time    Glucose (POC) 138 (H) 10/16/2019 11:14 PM    Glucose (POC) 122 (H) 10/16/2019 10:03 PM    Glucose (POC) 123 (H) 10/16/2019 09:01 PM    Glucose (POC) 247 (H) 10/16/2019 07:49 PM    Glucose (POC) 304 (H) 10/16/2019 06:41 PM    Glucose,  (H) 08/19/2010 02:20 AM     No results for input(s): PH, PCO2, PO2, HCO3, FIO2 in the last 72 hours.   No results for input(s): INR, INREXT in the last 72 hours. Lab Results   Component Value Date/Time    Specimen Description: BLOOD 01/09/2011 01:45 AM    Specimen Description: BLOOD 01/09/2011 01:30 AM    Specimen Description: SPUTUM 08/19/2010 01:00 PM     Lab Results   Component Value Date/Time    Culture result: FEW KLEBSIELLA PNEUMONIAE (A) 10/09/2019 10:44 AM    Culture result: MODERATE NORMAL RESPIRATORY STEPHON 10/09/2019 10:44 AM    Culture result:  05/12/2016 12:03 PM     MRSA target DNA is not detected (presumptive not colonized with MRSA)       All Cardiac Markers in the last 24 hours: No results found for: CPK, CK, CKMMB, CKMB, RCK3, CKMBT, CKNDX, CKND1, HUBER, TROPT, TROIQ, KAL, TROPT, TNIPOC, BNP, BNPP    XR Results:  Results from Hospital Encounter encounter on 10/08/19   XR CHEST PA LAT    Narrative EXAM: Two-view chest    CLINICAL HISTORY: Short of breath ,    COMPARISON: None    FINDINGS:    Frontal and lateral views of the chest demonstrate coarse interstitial markings  likely related to some areas of minor scarring. . Cardiac silhouette is normal in  size and contour. No acute bony or soft tissue abnormality. Impression IMPRESSION:    No significant interval change or acute pulmonary process identified. CT Results:  Results from Hospital Encounter encounter on 10/08/19   CTA CHEST W OR W WO CONT    Narrative EXAM: CTA chest    INDICATION: Chest pain. Short of breath    COMPARISON: Two-view chest 10/8/2019    TECHNIQUE: Axial CT imaging from the thoracic inlet through the diaphragm with  intravenous contrast. Coronal and sagittal MIP reformats were generated. One or more dose reduction techniques were used on this CT: automated exposure  control, adjustment of the mAs and/or kVp according to patient size, and  iterative reconstruction techniques. The specific techniques used on this CT  exam have been documented in the patient's electronic medical record.   Digital  Imaging and Communications in Medicine (DICOM) format image data are available  to nonaffiliated external healthcare facilities or entities on a secure, media  free, reciprocally searchable basis with patient authorization for at least a  12-month period after this study. _______________    FINDINGS:    EXAM QUALITY: Overall exam quality is diagnostic. PULMONARY ARTERIES: No evidence of pulmonary embolism. MEDIASTINUM: Heart is mildly enlarged. No pericardial effusion. Mild  atherosclerotic changes of aorta. Smaller coronary artery calcification. Esophagus is unremarkable. LUNGS: Small areas of groundglass density in the right apical region and  anterior upper lobe regions bilaterally. Less confluent areas of groundglass  density are present in the inferior left lower lobe. Nodular density along the  left major fissure is consistent with fissural lymph node. PLEURA: Normal.    AIRWAY: There is moderate narrowing of the distal trachea just above the domenica  with significant narrowing of the mainstem bronchi centrally. LYMPH NODES: Borderline enlarged superior mediastinal lymph node measures 11 mm  short axis. Prevascular lymph node measures 7 mm short axis. UPPER ABDOMEN: Unremarkable. OTHER: No acute or aggressive osseous abnormalities identified. _______________      Impression IMPRESSION:    1. No evidence of pulmonary embolism. 2.  Findings suggesting tracheomalacia involving the distal trachea and proximal  mainstem bronchi bilaterally. 3. Nonspecific areas of groundglass density in the upper lobes consistent with  alveolitis which may be related to central airway disease described in 2 above. Expiratory CT chest without contrast could be performed to confirm presence of  tracheomalacia. MRI Results:  Results from Abstract encounter on 02/17/13   MRI LOWER EXT JOINT LEFT W/O CONT       Nuclear Medicine Results:  No results found for this or any previous visit.     US Results:  No results found for this or any previous visit. IR Results:  No results found for this or any previous visit. VAS/US Results:  No results found for this or any previous visit. Total discharge time 41 minutes. Shmuel Buckley MD  Hospitalist  [facility]. medical group. Hospitalist Division.   October 17, 2019  9:57 AM

## 2019-11-19 ENCOUNTER — HOSPITAL ENCOUNTER (OUTPATIENT)
Dept: INFUSION THERAPY | Age: 59
End: 2019-11-19

## 2019-11-19 DIAGNOSIS — J45.901 EXACERBATION OF ASTHMA, UNSPECIFIED ASTHMA SEVERITY, UNSPECIFIED WHETHER PERSISTENT: ICD-10-CM

## 2019-11-26 ENCOUNTER — APPOINTMENT (OUTPATIENT)
Dept: INFUSION THERAPY | Age: 59
End: 2019-11-26

## 2019-11-26 ENCOUNTER — HOSPITAL ENCOUNTER (OUTPATIENT)
Dept: INFUSION THERAPY | Age: 59
Discharge: HOME | End: 2019-11-26

## 2019-11-26 DIAGNOSIS — J45.901 EXACERBATION OF ASTHMA, UNSPECIFIED ASTHMA SEVERITY, UNSPECIFIED WHETHER PERSISTENT: Primary | ICD-10-CM

## 2020-01-08 DIAGNOSIS — J45.901 EXACERBATION OF ASTHMA, UNSPECIFIED ASTHMA SEVERITY, UNSPECIFIED WHETHER PERSISTENT: ICD-10-CM

## 2020-01-14 ENCOUNTER — APPOINTMENT (OUTPATIENT)
Dept: INFUSION THERAPY | Age: 60
End: 2020-01-14

## 2020-02-12 ENCOUNTER — HOSPITAL ENCOUNTER (OUTPATIENT)
Dept: INFUSION THERAPY | Age: 60
Discharge: HOME | End: 2020-02-12
Payer: MEDICARE

## 2020-02-12 VITALS
TEMPERATURE: 97.2 F | SYSTOLIC BLOOD PRESSURE: 136 MMHG | DIASTOLIC BLOOD PRESSURE: 80 MMHG | RESPIRATION RATE: 18 BRPM | HEART RATE: 86 BPM | OXYGEN SATURATION: 98 %

## 2020-02-12 DIAGNOSIS — J45.901 EXACERBATION OF ASTHMA, UNSPECIFIED ASTHMA SEVERITY, UNSPECIFIED WHETHER PERSISTENT: Primary | ICD-10-CM

## 2020-02-12 PROCEDURE — 96372 THER/PROPH/DIAG INJ SC/IM: CPT

## 2020-02-12 PROCEDURE — 74011250636 HC RX REV CODE- 250/636: Performed by: INTERNAL MEDICINE

## 2020-02-12 RX ORDER — PRAZOSIN HYDROCHLORIDE 1 MG/1
1 CAPSULE ORAL
COMMUNITY

## 2020-02-12 RX ADMIN — BENRALIZUMAB 30 MG: 30 INJECTION, SOLUTION SUBCUTANEOUS at 10:45

## 2020-02-12 NOTE — PROGRESS NOTES
[facility] Progress Note    Date: 2020    Name: Cisco Alvarado    MRN: [id number]         : 1960    Len Pickard    Ms. Sujatha Delacruz was assessed and education was provided. Ms. Dennise Costa vitals were reviewed and patient was observed for 5 minutes prior to treatment. Visit Vitals  /80 (BP 1 Location: Right arm, BP Patient Position: Sitting)   Pulse 86   Temp 97.2 °F (36.2 °C)   Resp 18   SpO2 98%   Breastfeeding No       Lab results were obtained and reviewed. No results found for this or any previous visit (from the past 12 hour(s)). Fasenra 30 mg was administered subcutaneous in  Left upper arm. Band aid placed at site. Ms. Sujatha Delacruz tolerated well, and had no complaints. Patient armband removed and shredded. Ms. Sujatha Delacruz was discharged from [place] [address] in stable condition at 1050. She is to return on 3/11/20 at 56 for her next appointment.     Shanell Tavarez RN  2020

## 2020-03-04 DIAGNOSIS — J45.901 EXACERBATION OF ASTHMA, UNSPECIFIED ASTHMA SEVERITY, UNSPECIFIED WHETHER PERSISTENT: ICD-10-CM

## 2020-03-11 ENCOUNTER — HOSPITAL ENCOUNTER (OUTPATIENT)
Dept: INFUSION THERAPY | Age: 60
End: 2020-03-11
Payer: MEDICARE

## 2020-03-16 ENCOUNTER — HOSPITAL ENCOUNTER (OUTPATIENT)
Dept: INFUSION THERAPY | Age: 60
Discharge: HOME | End: 2020-03-16
Payer: MEDICARE

## 2020-03-16 VITALS
HEART RATE: 72 BPM | TEMPERATURE: 97.1 F | RESPIRATION RATE: 19 BRPM | SYSTOLIC BLOOD PRESSURE: 150 MMHG | OXYGEN SATURATION: 98 % | DIASTOLIC BLOOD PRESSURE: 80 MMHG

## 2020-03-16 DIAGNOSIS — J45.901 EXACERBATION OF ASTHMA, UNSPECIFIED ASTHMA SEVERITY, UNSPECIFIED WHETHER PERSISTENT: Primary | ICD-10-CM

## 2020-03-16 PROCEDURE — 74011250636 HC RX REV CODE- 250/636: Performed by: INTERNAL MEDICINE

## 2020-03-16 PROCEDURE — 96372 THER/PROPH/DIAG INJ SC/IM: CPT

## 2020-03-16 RX ADMIN — BENRALIZUMAB 30 MG: 30 INJECTION, SOLUTION SUBCUTANEOUS at 15:40

## 2020-03-16 NOTE — PROGRESS NOTES
[facility] Progress Note    Date: 2020    Name: Norberto Suggs    MRN: [id number]         : 1960    Marty Mederos    Ms. Merlin Kluver was assessed and education was provided. Ms. Mace Cuff vitals were reviewed and patient was observed for 5 minutes prior to treatment. Visit Vitals  /80 (BP 1 Location: Left arm, BP Patient Position: Sitting)   Pulse 72   Temp 97.1 °F (36.2 °C)   Resp 19   SpO2 98%       Fasenra 30mg was administered subcutaneous in  Left upper arm. Band-aid placed at site. Ms. Merlin Kluver tolerated well, and had no complaints. Patient armband removed and shredded. Ms. Merlin Kluver was discharged from [place] [address] in stable condition at [phone]. She is to return on 2020 at 1000 for her next appointment.     Jason Galvez RN  2020  8496

## 2020-04-08 ENCOUNTER — HOSPITAL ENCOUNTER (OUTPATIENT)
Dept: INFUSION THERAPY | Age: 60
End: 2020-04-08
Payer: MEDICARE

## 2020-04-13 ENCOUNTER — HOSPITAL ENCOUNTER (OUTPATIENT)
Dept: INFUSION THERAPY | Age: 60
Discharge: HOME | End: 2020-04-13
Payer: MEDICARE

## 2020-04-13 VITALS
RESPIRATION RATE: 18 BRPM | HEART RATE: 87 BPM | OXYGEN SATURATION: 98 % | SYSTOLIC BLOOD PRESSURE: 136 MMHG | TEMPERATURE: 97.3 F | DIASTOLIC BLOOD PRESSURE: 83 MMHG

## 2020-04-13 DIAGNOSIS — J45.901 EXACERBATION OF ASTHMA, UNSPECIFIED ASTHMA SEVERITY, UNSPECIFIED WHETHER PERSISTENT: Primary | ICD-10-CM

## 2020-04-13 PROCEDURE — 96372 THER/PROPH/DIAG INJ SC/IM: CPT

## 2020-04-13 PROCEDURE — 74011250636 HC RX REV CODE- 250/636: Performed by: INTERNAL MEDICINE

## 2020-04-13 RX ADMIN — BENRALIZUMAB 30 MG: 30 INJECTION, SOLUTION SUBCUTANEOUS at 16:04

## 2020-04-13 NOTE — PROGRESS NOTES
[facility] Progress Note    Date: 2020    Name: Luz Elena Alba    MRN: [id number]         : 1960    Yovany Solis    Ms. Lydia Flowers was assessed and education was provided. Ms. Cl Singh vitals were reviewed and patient was observed for 5 minutes prior to treatment. Visit Vitals  /83 (BP 1 Location: Left arm, BP Patient Position: Sitting)   Pulse 87   Temp 97.3 °F (36.3 °C)   Resp 18   SpO2 98%   Breastfeeding No       Fasenra 30mg was administered subcutaneous in  Left upper arm. Band-aid placed at site. Ms. Lydia Flowers tolerated well, and had no complaints. Patient armband removed and shredded. Ms. Lydia Flowers was discharged from [place] [address] in stable condition at 1610. She is to return on 6/3/2020 at  for her next appointment.     Anna Gauthier RN  2020  1610

## 2020-04-27 DIAGNOSIS — J45.901 EXACERBATION OF ASTHMA, UNSPECIFIED ASTHMA SEVERITY, UNSPECIFIED WHETHER PERSISTENT: ICD-10-CM

## 2020-06-03 ENCOUNTER — HOSPITAL ENCOUNTER (OUTPATIENT)
Dept: INFUSION THERAPY | Age: 60
End: 2020-06-03
Payer: MEDICARE

## 2020-06-08 ENCOUNTER — HOSPITAL ENCOUNTER (OUTPATIENT)
Dept: INFUSION THERAPY | Age: 60
Discharge: HOME | End: 2020-06-08
Payer: MEDICARE

## 2020-06-08 VITALS
OXYGEN SATURATION: 96 % | RESPIRATION RATE: 18 BRPM | SYSTOLIC BLOOD PRESSURE: 133 MMHG | HEART RATE: 82 BPM | TEMPERATURE: 97.7 F | DIASTOLIC BLOOD PRESSURE: 79 MMHG

## 2020-06-08 DIAGNOSIS — J45.901 EXACERBATION OF ASTHMA, UNSPECIFIED ASTHMA SEVERITY, UNSPECIFIED WHETHER PERSISTENT: Primary | ICD-10-CM

## 2020-06-08 PROCEDURE — 74011250636 HC RX REV CODE- 250/636: Performed by: NURSE PRACTITIONER

## 2020-06-08 PROCEDURE — 96372 THER/PROPH/DIAG INJ SC/IM: CPT

## 2020-06-08 RX ADMIN — BENRALIZUMAB 30 MG: 30 INJECTION, SOLUTION SUBCUTANEOUS at 10:06

## 2020-06-08 NOTE — PROGRESS NOTES
[facility] Progress Note    Date: 2020    Name: Roxy Barbour    MRN: [id number]         : 1960    Yates Lucius Injection    Ms. Bennie Nails was assessed and education was provided. Ms. Maribell Ortiz vitals were reviewed and patient was observed for 5 minutes prior to treatment. Visit Vitals  /79 (BP 1 Location: Left arm, BP Patient Position: Sitting)   Pulse 82   Temp 97.7 °F (36.5 °C)   Resp 18   SpO2 96%       Fasenra 30mg was administered subcutaneous in left upper arm. Band-aid placed at site. Ms. Bennie Nails tolerated well, and had no complaints. Patient armband removed and shredded. Ms. Bennie Nails was discharged from [place] [address] in stable condition at 1010. She is to return on 8/3/2020 at 1000 for her next appointment.     Robb Tse RN  2020  1010

## 2020-06-22 DIAGNOSIS — J45.901 EXACERBATION OF ASTHMA, UNSPECIFIED ASTHMA SEVERITY, UNSPECIFIED WHETHER PERSISTENT: ICD-10-CM

## 2020-08-03 ENCOUNTER — HOSPITAL ENCOUNTER (OUTPATIENT)
Dept: INFUSION THERAPY | Age: 60
Discharge: HOME | End: 2020-08-03
Payer: MEDICARE

## 2020-08-03 VITALS
SYSTOLIC BLOOD PRESSURE: 121 MMHG | HEART RATE: 85 BPM | RESPIRATION RATE: 18 BRPM | DIASTOLIC BLOOD PRESSURE: 76 MMHG | TEMPERATURE: 98.7 F | OXYGEN SATURATION: 99 %

## 2020-08-03 DIAGNOSIS — J45.901 EXACERBATION OF ASTHMA, UNSPECIFIED ASTHMA SEVERITY, UNSPECIFIED WHETHER PERSISTENT: Primary | ICD-10-CM

## 2020-08-03 PROCEDURE — 96372 THER/PROPH/DIAG INJ SC/IM: CPT

## 2020-08-03 PROCEDURE — 74011250636 HC RX REV CODE- 250/636: Performed by: NURSE PRACTITIONER

## 2020-08-03 RX ADMIN — BENRALIZUMAB 30 MG: 30 INJECTION, SOLUTION SUBCUTANEOUS at 09:56

## 2020-08-03 NOTE — PROGRESS NOTES
[facility] Progress Note    Date: August 3, 2020    Name: Ann Kaur    MRN: [id number]         : 1960    Michael Dimes Injection    Ms. Juan Manuel Ragsdale was assessed and education was provided. Ms. Marcio Fleming vitals were reviewed and patient was observed for 5 minutes prior to treatment. Visit Vitals  /76 (BP 1 Location: Left arm, BP Patient Position: Sitting)   Pulse 85   Temp 98.7 °F (37.1 °C)   Resp 18   SpO2 99%   Breastfeeding No       Fasenra 30mg was administered subcutaneous in left upper arm. Band-aid placed at site. Ms. Juan Manuel Ragsdale tolerated well, and had no complaints. Patient armband removed and shredded. Ms. Juan Manuel Ragsdale was discharged from [place] [address] in stable condition at 1000. She is to return on 2020 at 1000 for her next appointment.     Denise Preston RN  August 3, 2020  1000

## 2020-08-17 DIAGNOSIS — J45.901 EXACERBATION OF ASTHMA, UNSPECIFIED ASTHMA SEVERITY, UNSPECIFIED WHETHER PERSISTENT: ICD-10-CM

## 2020-09-28 ENCOUNTER — HOSPITAL ENCOUNTER (OUTPATIENT)
Dept: INFUSION THERAPY | Age: 60
Discharge: HOME | End: 2020-09-28
Payer: MEDICARE

## 2020-09-28 VITALS
SYSTOLIC BLOOD PRESSURE: 146 MMHG | RESPIRATION RATE: 18 BRPM | DIASTOLIC BLOOD PRESSURE: 81 MMHG | TEMPERATURE: 98.3 F | OXYGEN SATURATION: 99 % | HEART RATE: 76 BPM

## 2020-09-28 DIAGNOSIS — J45.901 EXACERBATION OF ASTHMA, UNSPECIFIED ASTHMA SEVERITY, UNSPECIFIED WHETHER PERSISTENT: Primary | ICD-10-CM

## 2020-09-28 PROCEDURE — 74011250636 HC RX REV CODE- 250/636: Performed by: NURSE PRACTITIONER

## 2020-09-28 PROCEDURE — 96372 THER/PROPH/DIAG INJ SC/IM: CPT

## 2020-09-28 RX ADMIN — BENRALIZUMAB 30 MG: 30 INJECTION, SOLUTION SUBCUTANEOUS at 10:20

## 2020-10-12 DIAGNOSIS — J45.901 EXACERBATION OF ASTHMA, UNSPECIFIED ASTHMA SEVERITY, UNSPECIFIED WHETHER PERSISTENT: ICD-10-CM

## 2020-12-03 ENCOUNTER — HOSPITAL ENCOUNTER (OUTPATIENT)
Dept: INFUSION THERAPY | Age: 60
Discharge: HOME | End: 2020-12-03
Payer: MEDICARE

## 2020-12-03 VITALS
DIASTOLIC BLOOD PRESSURE: 79 MMHG | OXYGEN SATURATION: 98 % | TEMPERATURE: 98.6 F | HEART RATE: 76 BPM | RESPIRATION RATE: 20 BRPM | SYSTOLIC BLOOD PRESSURE: 129 MMHG

## 2020-12-03 DIAGNOSIS — J45.901 EXACERBATION OF ASTHMA, UNSPECIFIED ASTHMA SEVERITY, UNSPECIFIED WHETHER PERSISTENT: Primary | ICD-10-CM

## 2020-12-03 PROCEDURE — 96372 THER/PROPH/DIAG INJ SC/IM: CPT

## 2020-12-03 PROCEDURE — 74011250636 HC RX REV CODE- 250/636: Performed by: INTERNAL MEDICINE

## 2020-12-03 RX ADMIN — BENRALIZUMAB 30 MG: 30 INJECTION, SOLUTION SUBCUTANEOUS at 10:25

## 2020-12-03 NOTE — PROGRESS NOTES
[facility] Progress Note    Date: December 3, 2020    Name: Aleks Quinteros    MRN: [id number]         : 1960    Flaco Juan Injection    Ms. Elisabeth Aguayo arrived ambulatory and in no acute distress to [facility], at 56. Patient was assessed and education was provided. Patient denied complaint of pain/discomfort, recent change to medication regimen or health condition. Ms. Nancy Conte vitals were reviewed and patient was observed for 5 minutes prior to treatment. Visit Vitals  /79 (BP 1 Location: Left arm, BP Patient Position: At rest;Sitting)   Pulse 76   Temp 98.6 °F (37 °C)   Resp 20   SpO2 98%   Breastfeeding No     Fasenra 30mg inj was administered SQ to posterior aspect of left upper arm. No evidence of swelling or bleeding noted to injection site. Band-aid placed at site. Ms. Elisabeth Aguayo tolerated well, and had no complaints. Patient armband removed and shredded. Ms. Elisabeth Aguayo was discharged from [place] [address] in stable condition at 1030. She is to return on 1at 1000 for her next appointment.     Leelee Foster RN  December 3, 2020  1000

## 2021-01-18 ENCOUNTER — APPOINTMENT (OUTPATIENT)
Dept: INFUSION THERAPY | Age: 61
End: 2021-01-18

## 2021-01-28 ENCOUNTER — HOSPITAL ENCOUNTER (OUTPATIENT)
Dept: INFUSION THERAPY | Age: 61
Discharge: HOME | End: 2021-01-28
Payer: MEDICARE

## 2021-01-28 VITALS
SYSTOLIC BLOOD PRESSURE: 138 MMHG | TEMPERATURE: 98.6 F | OXYGEN SATURATION: 99 % | RESPIRATION RATE: 20 BRPM | DIASTOLIC BLOOD PRESSURE: 85 MMHG | HEART RATE: 75 BPM

## 2021-01-28 DIAGNOSIS — J45.901 EXACERBATION OF ASTHMA, UNSPECIFIED ASTHMA SEVERITY, UNSPECIFIED WHETHER PERSISTENT: Primary | ICD-10-CM

## 2021-01-28 PROCEDURE — 74011250636 HC RX REV CODE- 250/636: Performed by: NURSE PRACTITIONER

## 2021-01-28 PROCEDURE — 96372 THER/PROPH/DIAG INJ SC/IM: CPT

## 2021-01-28 RX ADMIN — BENRALIZUMAB 30 MG: 30 INJECTION, SOLUTION SUBCUTANEOUS at 10:09

## 2021-01-28 NOTE — PROGRESS NOTES
[facility] Progress Note    Date: 2021    Name: Vinicio Barr    MRN: [id number]         : 1960     Gareld Friendly injection      Ms. Patrick Vieira arrived to [facility] at 1000. Ms. Patrick Vieira was assessed and education was provided. Ms. Giacomo Johnson vitals were reviewed. Visit Vitals  /85 (BP 1 Location: Left arm, BP Patient Position: At rest;Sitting)   Pulse 75   Temp 98.6 °F (37 °C)   Resp 20   SpO2 99%   Breastfeeding No         Fasenra 30 mg was administered as ordered SQ in patient's Left upper arm ( left) and band-aid applied. Ms. Patrick Vieira tolerated well without complaints. Ms. Patrick Vieira was discharged from [place] [address] in stable condition at 1015. She is to return on 2021 at 1000 for her next appointment.     Huma Kemp RN  2021

## 2021-03-25 ENCOUNTER — HOSPITAL ENCOUNTER (OUTPATIENT)
Dept: INFUSION THERAPY | Age: 61
End: 2021-03-25

## 2021-04-07 ENCOUNTER — APPOINTMENT (OUTPATIENT)
Dept: INFUSION THERAPY | Age: 61
End: 2021-04-07

## 2021-04-09 RX ORDER — DIPHENHYDRAMINE HYDROCHLORIDE 50 MG/ML
50 INJECTION, SOLUTION INTRAMUSCULAR; INTRAVENOUS AS NEEDED
Status: CANCELLED
Start: 2021-04-14

## 2021-04-09 RX ORDER — HYDROCORTISONE SODIUM SUCCINATE 100 MG/2ML
100 INJECTION, POWDER, FOR SOLUTION INTRAMUSCULAR; INTRAVENOUS AS NEEDED
Status: CANCELLED | OUTPATIENT
Start: 2021-04-14

## 2021-04-09 RX ORDER — ONDANSETRON 2 MG/ML
8 INJECTION INTRAMUSCULAR; INTRAVENOUS AS NEEDED
Status: CANCELLED | OUTPATIENT
Start: 2021-04-14

## 2021-04-09 RX ORDER — ACETAMINOPHEN 325 MG/1
650 TABLET ORAL AS NEEDED
Status: CANCELLED
Start: 2021-04-14

## 2021-04-09 RX ORDER — ALBUTEROL SULFATE 0.83 MG/ML
2.5 SOLUTION RESPIRATORY (INHALATION) AS NEEDED
Status: CANCELLED
Start: 2021-04-14

## 2021-04-09 RX ORDER — DIPHENHYDRAMINE HYDROCHLORIDE 50 MG/ML
25 INJECTION, SOLUTION INTRAMUSCULAR; INTRAVENOUS AS NEEDED
Status: CANCELLED
Start: 2021-04-14

## 2021-04-09 RX ORDER — EPINEPHRINE 1 MG/ML
0.3 INJECTION, SOLUTION, CONCENTRATE INTRAVENOUS AS NEEDED
Status: CANCELLED | OUTPATIENT
Start: 2021-04-14

## 2021-04-14 ENCOUNTER — HOSPITAL ENCOUNTER (OUTPATIENT)
Dept: INFUSION THERAPY | Age: 61
Discharge: HOME | End: 2021-04-14
Payer: MEDICARE

## 2021-04-14 VITALS
SYSTOLIC BLOOD PRESSURE: 117 MMHG | OXYGEN SATURATION: 100 % | RESPIRATION RATE: 18 BRPM | TEMPERATURE: 96.5 F | DIASTOLIC BLOOD PRESSURE: 68 MMHG | HEART RATE: 77 BPM

## 2021-04-14 DIAGNOSIS — J45.901 EXACERBATION OF ASTHMA, UNSPECIFIED ASTHMA SEVERITY, UNSPECIFIED WHETHER PERSISTENT: Primary | ICD-10-CM

## 2021-04-14 PROCEDURE — 74011250636 HC RX REV CODE- 250/636: Performed by: INTERNAL MEDICINE

## 2021-04-14 PROCEDURE — 96372 THER/PROPH/DIAG INJ SC/IM: CPT

## 2021-04-14 RX ORDER — ACETAMINOPHEN 325 MG/1
650 TABLET ORAL AS NEEDED
Status: CANCELLED
Start: 2021-06-09

## 2021-04-14 RX ORDER — ALBUTEROL SULFATE 0.83 MG/ML
2.5 SOLUTION RESPIRATORY (INHALATION) AS NEEDED
Status: CANCELLED
Start: 2021-06-09

## 2021-04-14 RX ORDER — DIPHENHYDRAMINE HYDROCHLORIDE 50 MG/ML
50 INJECTION, SOLUTION INTRAMUSCULAR; INTRAVENOUS AS NEEDED
Status: CANCELLED
Start: 2021-06-09

## 2021-04-14 RX ORDER — HYDROCORTISONE SODIUM SUCCINATE 100 MG/2ML
100 INJECTION, POWDER, FOR SOLUTION INTRAMUSCULAR; INTRAVENOUS AS NEEDED
Status: CANCELLED | OUTPATIENT
Start: 2021-06-09

## 2021-04-14 RX ORDER — DIPHENHYDRAMINE HYDROCHLORIDE 50 MG/ML
25 INJECTION, SOLUTION INTRAMUSCULAR; INTRAVENOUS AS NEEDED
Status: CANCELLED
Start: 2021-06-09

## 2021-04-14 RX ORDER — EPINEPHRINE 1 MG/ML
0.3 INJECTION, SOLUTION, CONCENTRATE INTRAVENOUS AS NEEDED
Status: CANCELLED | OUTPATIENT
Start: 2021-06-09

## 2021-04-14 RX ORDER — ONDANSETRON 2 MG/ML
8 INJECTION INTRAMUSCULAR; INTRAVENOUS AS NEEDED
Status: CANCELLED | OUTPATIENT
Start: 2021-06-09

## 2021-04-14 RX ADMIN — BENRALIZUMAB 30 MG: 30 INJECTION, SOLUTION SUBCUTANEOUS at 15:44

## 2021-04-14 NOTE — PROGRESS NOTES
[facility] Progress Note    Date: 2021    Name: Alexis Ortiz    MRN: [id number]         : 1960     Levorn Heriberto injection      Ms. Robinson Garcia arrived to [facility] at [phone] 61 16. Ms. Robinson Garcia was assessed and education was provided. Ms. Francine Pacheco vitals were reviewed. Visit Vitals  /68 (BP 1 Location: Left upper arm, BP Patient Position: Sitting)   Pulse 77   Temp (!) 96.5 °F (35.8 °C)   Resp 18   SpO2 100%   Breastfeeding No         Fasenra 30 mg was administered as ordered SQ in patient's Left upper arm ( left) and band-aid applied. Ms. Robinson Garcia tolerated well without complaints. Ms. Robinson Garcia was discharged from [place] [address] in stable condition at [phone]. She is to return on 2021 at 1500 for her next appointment.     Manda Loera  2021

## 2021-05-20 ENCOUNTER — APPOINTMENT (OUTPATIENT)
Dept: INFUSION THERAPY | Age: 61
End: 2021-05-20

## 2021-08-04 ENCOUNTER — HOSPITAL ENCOUNTER (OUTPATIENT)
Dept: INFUSION THERAPY | Age: 61
End: 2021-08-04

## 2021-08-23 RX ORDER — DIPHENHYDRAMINE HYDROCHLORIDE 50 MG/ML
25 INJECTION, SOLUTION INTRAMUSCULAR; INTRAVENOUS AS NEEDED
Start: 2021-08-25

## 2021-08-23 RX ORDER — ALBUTEROL SULFATE 0.83 MG/ML
2.5 SOLUTION RESPIRATORY (INHALATION) AS NEEDED
Start: 2021-08-25

## 2021-08-23 RX ORDER — DIPHENHYDRAMINE HYDROCHLORIDE 50 MG/ML
50 INJECTION, SOLUTION INTRAMUSCULAR; INTRAVENOUS AS NEEDED
Start: 2021-08-25

## 2021-08-23 RX ORDER — EPINEPHRINE 1 MG/ML
0.3 INJECTION, SOLUTION, CONCENTRATE INTRAVENOUS AS NEEDED
OUTPATIENT
Start: 2021-08-25

## 2021-08-23 RX ORDER — ACETAMINOPHEN 325 MG/1
650 TABLET ORAL AS NEEDED
Start: 2021-08-25

## 2021-08-23 RX ORDER — ONDANSETRON 2 MG/ML
8 INJECTION INTRAMUSCULAR; INTRAVENOUS AS NEEDED
OUTPATIENT
Start: 2021-08-25

## 2021-08-23 RX ORDER — HYDROCORTISONE SODIUM SUCCINATE 100 MG/2ML
100 INJECTION, POWDER, FOR SOLUTION INTRAMUSCULAR; INTRAVENOUS AS NEEDED
OUTPATIENT
Start: 2021-08-25

## 2021-08-25 ENCOUNTER — HOSPITAL ENCOUNTER (OUTPATIENT)
Dept: INFUSION THERAPY | Age: 61
Discharge: HOME | End: 2021-08-25
Payer: MEDICARE

## 2021-08-25 VITALS — SYSTOLIC BLOOD PRESSURE: 112 MMHG | TEMPERATURE: 98.5 F | RESPIRATION RATE: 18 BRPM | DIASTOLIC BLOOD PRESSURE: 68 MMHG

## 2021-08-25 DIAGNOSIS — J45.901 EXACERBATION OF ASTHMA, UNSPECIFIED ASTHMA SEVERITY, UNSPECIFIED WHETHER PERSISTENT: Primary | ICD-10-CM

## 2021-08-25 PROCEDURE — 96372 THER/PROPH/DIAG INJ SC/IM: CPT

## 2021-08-25 PROCEDURE — 74011250636 HC RX REV CODE- 250/636: Performed by: INTERNAL MEDICINE

## 2021-08-25 RX ORDER — DIPHENHYDRAMINE HYDROCHLORIDE 50 MG/ML
50 INJECTION, SOLUTION INTRAMUSCULAR; INTRAVENOUS AS NEEDED
Start: 2021-10-20

## 2021-08-25 RX ORDER — ACETAMINOPHEN 325 MG/1
650 TABLET ORAL AS NEEDED
Start: 2021-10-20

## 2021-08-25 RX ORDER — DIPHENHYDRAMINE HYDROCHLORIDE 50 MG/ML
25 INJECTION, SOLUTION INTRAMUSCULAR; INTRAVENOUS AS NEEDED
Start: 2021-10-20

## 2021-08-25 RX ORDER — ONDANSETRON 2 MG/ML
8 INJECTION INTRAMUSCULAR; INTRAVENOUS AS NEEDED
OUTPATIENT
Start: 2021-10-20

## 2021-08-25 RX ORDER — HYDROCORTISONE SODIUM SUCCINATE 100 MG/2ML
100 INJECTION, POWDER, FOR SOLUTION INTRAMUSCULAR; INTRAVENOUS AS NEEDED
OUTPATIENT
Start: 2021-10-20

## 2021-08-25 RX ORDER — EPINEPHRINE 1 MG/ML
0.3 INJECTION, SOLUTION, CONCENTRATE INTRAVENOUS AS NEEDED
OUTPATIENT
Start: 2021-10-20

## 2021-08-25 RX ORDER — ALBUTEROL SULFATE 0.83 MG/ML
2.5 SOLUTION RESPIRATORY (INHALATION) AS NEEDED
Start: 2021-10-20

## 2021-08-25 RX ADMIN — BENRALIZUMAB 30 MG: 30 INJECTION, SOLUTION SUBCUTANEOUS at 10:40

## 2021-08-25 NOTE — PROGRESS NOTES
[facility] Progress Note    Date: 2021    Name: Adebayo Smith    MRN: [id number]         : 1960     Preetamador Nava Injection      Ms. Emily Lora arrived to [facility] at 21 [phone]. Ms. Emily Lora was assessed and education was provided. Ms. Ponce Patch vitals were reviewed. Visit Vitals  /68 (BP 1 Location: Left upper arm, BP Patient Position: Sitting)   Temp 98.5 °F (36.9 °C)   Resp 18   Breastfeeding No         Fasenra 30 mg  was administered as ordered SQ in patient's Left upper arm and band-aid applied to site. Ms. Emily Lora tolerated well without complaints. Discharge/ follow-up instructions discussed w/ pt. Pt verbalized understanding. Pt armband removed & shredded. Ms. Emily Lora was discharged from [place] [address] in stable condition at [phone]. She is to follow up with referring provider/phsyician as needed.     Albino Hankins RN  2021

## 2021-09-29 ENCOUNTER — APPOINTMENT (OUTPATIENT)
Dept: INFUSION THERAPY | Age: 61
End: 2021-09-29

## 2021-10-14 ENCOUNTER — APPOINTMENT (OUTPATIENT)
Dept: INFUSION THERAPY | Age: 61
End: 2021-10-14

## 2021-10-20 ENCOUNTER — APPOINTMENT (OUTPATIENT)
Dept: INFUSION THERAPY | Age: 61
End: 2021-10-20